# Patient Record
Sex: FEMALE | Race: WHITE | Employment: OTHER | URBAN - METROPOLITAN AREA
[De-identification: names, ages, dates, MRNs, and addresses within clinical notes are randomized per-mention and may not be internally consistent; named-entity substitution may affect disease eponyms.]

---

## 2017-04-26 ENCOUNTER — TELEPHONE (OUTPATIENT)
Dept: UROLOGY | Age: 82
End: 2017-04-26

## 2019-05-17 ENCOUNTER — APPOINTMENT (OUTPATIENT)
Dept: GENERAL RADIOLOGY | Age: 84
DRG: 177 | End: 2019-05-17
Payer: MEDICARE

## 2019-05-17 ENCOUNTER — APPOINTMENT (OUTPATIENT)
Dept: CT IMAGING | Age: 84
DRG: 177 | End: 2019-05-17
Payer: MEDICARE

## 2019-05-17 ENCOUNTER — HOSPITAL ENCOUNTER (INPATIENT)
Age: 84
LOS: 8 days | Discharge: NURSING, REHAB OR LONG-TERM CARE | DRG: 177 | End: 2019-05-25
Attending: EMERGENCY MEDICINE | Admitting: FAMILY MEDICINE
Payer: MEDICARE

## 2019-05-17 DIAGNOSIS — R40.4 TRANSIENT ALTERATION OF AWARENESS: ICD-10-CM

## 2019-05-17 DIAGNOSIS — R09.02 HYPOXIA: ICD-10-CM

## 2019-05-17 DIAGNOSIS — I48.0 PAROXYSMAL ATRIAL FIBRILLATION (HCC): Primary | ICD-10-CM

## 2019-05-17 PROBLEM — J44.9 COPD (CHRONIC OBSTRUCTIVE PULMONARY DISEASE) (HCC): Status: ACTIVE | Noted: 2019-05-17

## 2019-05-17 LAB
-: ABNORMAL
ABSOLUTE EOS #: 0 K/UL (ref 0–0.4)
ABSOLUTE IMMATURE GRANULOCYTE: ABNORMAL K/UL (ref 0–0.3)
ABSOLUTE LYMPH #: 0.9 K/UL (ref 1–4.8)
ABSOLUTE MONO #: 0.9 K/UL (ref 0.1–1.3)
ALBUMIN SERPL-MCNC: 3.2 G/DL (ref 3.5–5.2)
ALBUMIN/GLOBULIN RATIO: ABNORMAL (ref 1–2.5)
ALLEN TEST: ABNORMAL
ALP BLD-CCNC: 111 U/L (ref 35–104)
ALT SERPL-CCNC: 10 U/L (ref 5–33)
AMORPHOUS: ABNORMAL
ANION GAP SERPL CALCULATED.3IONS-SCNC: ABNORMAL MMOL/L (ref 9–17)
AST SERPL-CCNC: 18 U/L
BACTERIA: ABNORMAL
BASOPHILS # BLD: 1 % (ref 0–2)
BASOPHILS ABSOLUTE: 0.1 K/UL (ref 0–0.2)
BILIRUB SERPL-MCNC: 0.41 MG/DL (ref 0.3–1.2)
BILIRUBIN URINE: NEGATIVE
BNP INTERPRETATION: ABNORMAL
BUN BLDV-MCNC: 6 MG/DL (ref 8–23)
BUN BLDV-MCNC: 7 MG/DL (ref 8–23)
BUN/CREAT BLD: ABNORMAL (ref 9–20)
BUN/CREAT BLD: ABNORMAL (ref 9–20)
CALCIUM SERPL-MCNC: 8.5 MG/DL (ref 8.6–10.4)
CALCIUM SERPL-MCNC: 9 MG/DL (ref 8.6–10.4)
CARBOXYHEMOGLOBIN: 1.2 % (ref 0–5)
CASTS UA: ABNORMAL /LPF
CHLORIDE BLD-SCNC: 80 MMOL/L (ref 98–107)
CHLORIDE BLD-SCNC: 81 MMOL/L (ref 98–107)
CHLORIDE BLD-SCNC: 82 MMOL/L (ref 98–107)
CO2: >45 MMOL/L (ref 20–31)
COLOR: YELLOW
COMMENT UA: ABNORMAL
CREAT SERPL-MCNC: 0.5 MG/DL (ref 0.5–0.9)
CREAT SERPL-MCNC: 0.6 MG/DL (ref 0.5–0.9)
CRYSTALS, UA: ABNORMAL /HPF
CULTURE: NORMAL
DIFFERENTIAL TYPE: ABNORMAL
DIRECT EXAM: NORMAL
EOSINOPHILS RELATIVE PERCENT: 1 % (ref 0–4)
EPITHELIAL CELLS UA: ABNORMAL /HPF
FIO2: ABNORMAL
GFR AFRICAN AMERICAN: >60 ML/MIN
GFR AFRICAN AMERICAN: >60 ML/MIN
GFR NON-AFRICAN AMERICAN: >60 ML/MIN
GFR NON-AFRICAN AMERICAN: >60 ML/MIN
GFR SERPL CREATININE-BSD FRML MDRD: ABNORMAL ML/MIN/{1.73_M2}
GLUCOSE BLD-MCNC: 136 MG/DL (ref 70–99)
GLUCOSE BLD-MCNC: 181 MG/DL (ref 65–105)
GLUCOSE BLD-MCNC: 189 MG/DL (ref 70–99)
GLUCOSE URINE: NEGATIVE
HCO3 ARTERIAL: 60.7 MMOL/L (ref 22–26)
HCT VFR BLD CALC: 27.1 % (ref 36–46)
HCT VFR BLD CALC: 29.5 % (ref 36–46)
HEMOGLOBIN: 8.7 G/DL (ref 12–16)
HEMOGLOBIN: 9.6 G/DL (ref 12–16)
IMMATURE GRANULOCYTES: ABNORMAL %
INR BLD: 0.9
KETONES, URINE: NEGATIVE
LEUKOCYTE ESTERASE, URINE: ABNORMAL
LIPASE: 14 U/L (ref 13–60)
LV EF: 55 %
LVEF MODALITY: NORMAL
LYMPHOCYTES # BLD: 10 % (ref 24–44)
Lab: NORMAL
MAGNESIUM: 2.4 MG/DL (ref 1.6–2.6)
MAGNESIUM: 2.5 MG/DL (ref 1.6–2.6)
MCH RBC QN AUTO: 28.1 PG (ref 26–34)
MCH RBC QN AUTO: 28.5 PG (ref 26–34)
MCHC RBC AUTO-ENTMCNC: 32 G/DL (ref 31–37)
MCHC RBC AUTO-ENTMCNC: 32.7 G/DL (ref 31–37)
MCV RBC AUTO: 87.1 FL (ref 80–100)
MCV RBC AUTO: 87.8 FL (ref 80–100)
METHEMOGLOBIN: 0.4 % (ref 0–1.9)
MODE: ABNORMAL
MONOCYTES # BLD: 10 % (ref 1–7)
MUCUS: ABNORMAL
NEGATIVE BASE EXCESS, ART: ABNORMAL MMOL/L (ref 0–2)
NITRITE, URINE: POSITIVE
NOTIFICATION TIME: ABNORMAL
NOTIFICATION: ABNORMAL
NRBC AUTOMATED: ABNORMAL PER 100 WBC
NRBC AUTOMATED: ABNORMAL PER 100 WBC
O2 DEVICE/FLOW/%: ABNORMAL
O2 SAT, ARTERIAL: 87.9 % (ref 95–98)
OTHER OBSERVATIONS UA: ABNORMAL
OXYHEMOGLOBIN: ABNORMAL % (ref 95–98)
PARTIAL THROMBOPLASTIN TIME: 31.3 SEC (ref 24–36)
PATIENT TEMP: 37
PCO2 ARTERIAL: 70.7 MMHG (ref 35–45)
PCO2, ART, TEMP ADJ: ABNORMAL (ref 35–45)
PDW BLD-RTO: 16.3 % (ref 11.5–14.9)
PDW BLD-RTO: 16.4 % (ref 11.5–14.9)
PEEP/CPAP: ABNORMAL
PH ARTERIAL: 7.54 (ref 7.35–7.45)
PH UA: 8.5 (ref 5–8)
PH, ART, TEMP ADJ: ABNORMAL (ref 7.35–7.45)
PLATELET # BLD: 323 K/UL (ref 150–450)
PLATELET # BLD: 333 K/UL (ref 150–450)
PLATELET ESTIMATE: ABNORMAL
PMV BLD AUTO: 7.5 FL (ref 6–12)
PMV BLD AUTO: 7.7 FL (ref 6–12)
PO2 ARTERIAL: 53.3 MMHG (ref 80–100)
PO2, ART, TEMP ADJ: ABNORMAL MMHG (ref 80–100)
POSITIVE BASE EXCESS, ART: ABNORMAL MMOL/L (ref 0–2)
POTASSIUM SERPL-SCNC: 2.8 MMOL/L (ref 3.7–5.3)
POTASSIUM SERPL-SCNC: 2.8 MMOL/L (ref 3.7–5.3)
POTASSIUM SERPL-SCNC: 3.7 MMOL/L (ref 3.7–5.3)
PRO-BNP: ABNORMAL PG/ML
PROCALCITONIN: 0.06 NG/ML
PROTEIN UA: NEGATIVE
PROTHROMBIN TIME: 12.6 SEC (ref 11.8–14.6)
PSV: ABNORMAL
PT. POSITION: ABNORMAL
RBC # BLD: 3.09 M/UL (ref 4–5.2)
RBC # BLD: 3.38 M/UL (ref 4–5.2)
RBC # BLD: ABNORMAL 10*6/UL
RBC UA: ABNORMAL /HPF
RENAL EPITHELIAL, UA: ABNORMAL /HPF
RESPIRATORY RATE: 29
SAMPLE SITE: ABNORMAL
SEG NEUTROPHILS: 78 % (ref 36–66)
SEGMENTED NEUTROPHILS ABSOLUTE COUNT: 6.6 K/UL (ref 1.3–9.1)
SET RATE: ABNORMAL
SODIUM BLD-SCNC: 138 MMOL/L (ref 135–144)
SODIUM BLD-SCNC: 139 MMOL/L (ref 135–144)
SODIUM BLD-SCNC: 141 MMOL/L (ref 135–144)
SPECIFIC GRAVITY UA: 1.01 (ref 1–1.03)
SPECIMEN DESCRIPTION: NORMAL
TEXT FOR RESPIRATORY: ABNORMAL
TOTAL HB: ABNORMAL G/DL (ref 12–16)
TOTAL PROTEIN: 6.9 G/DL (ref 6.4–8.3)
TOTAL RATE: ABNORMAL
TRICHOMONAS: ABNORMAL
TROPONIN INTERP: ABNORMAL
TROPONIN T: ABNORMAL NG/ML
TROPONIN, HIGH SENSITIVITY: 33 NG/L (ref 0–14)
TROPONIN, HIGH SENSITIVITY: 33 NG/L (ref 0–14)
TROPONIN, HIGH SENSITIVITY: 34 NG/L (ref 0–14)
TSH SERPL DL<=0.05 MIU/L-ACNC: 1.11 MIU/L (ref 0.3–5)
TURBIDITY: ABNORMAL
URINE HGB: NEGATIVE
UROBILINOGEN, URINE: NORMAL
VT: ABNORMAL
WBC # BLD: 12.1 K/UL (ref 3.5–11)
WBC # BLD: 8.5 K/UL (ref 3.5–11)
WBC # BLD: ABNORMAL 10*3/UL
WBC UA: ABNORMAL /HPF
YEAST: ABNORMAL

## 2019-05-17 PROCEDURE — 80051 ELECTROLYTE PANEL: CPT

## 2019-05-17 PROCEDURE — 82805 BLOOD GASES W/O2 SATURATION: CPT

## 2019-05-17 PROCEDURE — 6370000000 HC RX 637 (ALT 250 FOR IP): Performed by: INTERNAL MEDICINE

## 2019-05-17 PROCEDURE — 93005 ELECTROCARDIOGRAM TRACING: CPT

## 2019-05-17 PROCEDURE — 6370000000 HC RX 637 (ALT 250 FOR IP): Performed by: EMERGENCY MEDICINE

## 2019-05-17 PROCEDURE — 93306 TTE W/DOPPLER COMPLETE: CPT

## 2019-05-17 PROCEDURE — 36600 WITHDRAWAL OF ARTERIAL BLOOD: CPT

## 2019-05-17 PROCEDURE — 71045 X-RAY EXAM CHEST 1 VIEW: CPT

## 2019-05-17 PROCEDURE — 6370000000 HC RX 637 (ALT 250 FOR IP): Performed by: FAMILY MEDICINE

## 2019-05-17 PROCEDURE — 85730 THROMBOPLASTIN TIME PARTIAL: CPT

## 2019-05-17 PROCEDURE — 94640 AIRWAY INHALATION TREATMENT: CPT

## 2019-05-17 PROCEDURE — 86403 PARTICLE AGGLUT ANTBDY SCRN: CPT

## 2019-05-17 PROCEDURE — 85025 COMPLETE CBC W/AUTO DIFF WBC: CPT

## 2019-05-17 PROCEDURE — 87186 SC STD MICRODIL/AGAR DIL: CPT

## 2019-05-17 PROCEDURE — 85610 PROTHROMBIN TIME: CPT

## 2019-05-17 PROCEDURE — 6360000002 HC RX W HCPCS: Performed by: INTERNAL MEDICINE

## 2019-05-17 PROCEDURE — 96374 THER/PROPH/DIAG INJ IV PUSH: CPT

## 2019-05-17 PROCEDURE — 36415 COLL VENOUS BLD VENIPUNCTURE: CPT

## 2019-05-17 PROCEDURE — 81001 URINALYSIS AUTO W/SCOPE: CPT

## 2019-05-17 PROCEDURE — 87077 CULTURE AEROBIC IDENTIFY: CPT

## 2019-05-17 PROCEDURE — 2580000003 HC RX 258: Performed by: FAMILY MEDICINE

## 2019-05-17 PROCEDURE — 94660 CPAP INITIATION&MGMT: CPT

## 2019-05-17 PROCEDURE — 87070 CULTURE OTHR SPECIMN AEROBIC: CPT

## 2019-05-17 PROCEDURE — 87205 SMEAR GRAM STAIN: CPT

## 2019-05-17 PROCEDURE — 87185 SC STD ENZYME DETCJ PER NZM: CPT

## 2019-05-17 PROCEDURE — 82947 ASSAY GLUCOSE BLOOD QUANT: CPT

## 2019-05-17 PROCEDURE — 80048 BASIC METABOLIC PNL TOTAL CA: CPT

## 2019-05-17 PROCEDURE — 70450 CT HEAD/BRAIN W/O DYE: CPT

## 2019-05-17 PROCEDURE — 96375 TX/PRO/DX INJ NEW DRUG ADDON: CPT

## 2019-05-17 PROCEDURE — 2700000000 HC OXYGEN THERAPY PER DAY

## 2019-05-17 PROCEDURE — 84484 ASSAY OF TROPONIN QUANT: CPT

## 2019-05-17 PROCEDURE — 83690 ASSAY OF LIPASE: CPT

## 2019-05-17 PROCEDURE — 84443 ASSAY THYROID STIM HORMONE: CPT

## 2019-05-17 PROCEDURE — 94761 N-INVAS EAR/PLS OXIMETRY MLT: CPT

## 2019-05-17 PROCEDURE — 80053 COMPREHEN METABOLIC PANEL: CPT

## 2019-05-17 PROCEDURE — 99285 EMERGENCY DEPT VISIT HI MDM: CPT

## 2019-05-17 PROCEDURE — 6360000002 HC RX W HCPCS: Performed by: FAMILY MEDICINE

## 2019-05-17 PROCEDURE — 83735 ASSAY OF MAGNESIUM: CPT

## 2019-05-17 PROCEDURE — 83880 ASSAY OF NATRIURETIC PEPTIDE: CPT

## 2019-05-17 PROCEDURE — 2060000000 HC ICU INTERMEDIATE R&B

## 2019-05-17 PROCEDURE — 85027 COMPLETE CBC AUTOMATED: CPT

## 2019-05-17 PROCEDURE — 84145 PROCALCITONIN (PCT): CPT

## 2019-05-17 PROCEDURE — 87086 URINE CULTURE/COLONY COUNT: CPT

## 2019-05-17 PROCEDURE — 6360000002 HC RX W HCPCS: Performed by: EMERGENCY MEDICINE

## 2019-05-17 RX ORDER — POTASSIUM CHLORIDE 20 MEQ/1
20 TABLET, EXTENDED RELEASE ORAL ONCE
Status: COMPLETED | OUTPATIENT
Start: 2019-05-17 | End: 2019-05-17

## 2019-05-17 RX ORDER — SODIUM CHLORIDE 0.9 % (FLUSH) 0.9 %
10 SYRINGE (ML) INJECTION PRN
Status: DISCONTINUED | OUTPATIENT
Start: 2019-05-17 | End: 2019-05-26 | Stop reason: HOSPADM

## 2019-05-17 RX ORDER — IPRATROPIUM BROMIDE AND ALBUTEROL SULFATE 2.5; .5 MG/3ML; MG/3ML
3 SOLUTION RESPIRATORY (INHALATION) ONCE
Status: COMPLETED | OUTPATIENT
Start: 2019-05-17 | End: 2019-05-17

## 2019-05-17 RX ORDER — ACETAMINOPHEN 325 MG/1
650 TABLET ORAL EVERY 4 HOURS PRN
Status: DISCONTINUED | OUTPATIENT
Start: 2019-05-17 | End: 2019-05-26 | Stop reason: HOSPADM

## 2019-05-17 RX ORDER — SPIRONOLACTONE 25 MG/1
50 TABLET ORAL DAILY
Status: DISCONTINUED | OUTPATIENT
Start: 2019-05-17 | End: 2019-05-26 | Stop reason: HOSPADM

## 2019-05-17 RX ORDER — POTASSIUM CHLORIDE 20 MEQ/1
40 TABLET, EXTENDED RELEASE ORAL ONCE
Status: COMPLETED | OUTPATIENT
Start: 2019-05-17 | End: 2019-05-17

## 2019-05-17 RX ORDER — FUROSEMIDE 10 MG/ML
40 INJECTION INTRAMUSCULAR; INTRAVENOUS ONCE
Status: COMPLETED | OUTPATIENT
Start: 2019-05-17 | End: 2019-05-17

## 2019-05-17 RX ORDER — POTASSIUM CHLORIDE 7.45 MG/ML
10 INJECTION INTRAVENOUS PRN
Status: DISCONTINUED | OUTPATIENT
Start: 2019-05-17 | End: 2019-05-26 | Stop reason: HOSPADM

## 2019-05-17 RX ORDER — GUAIFENESIN 600 MG/1
600 TABLET, EXTENDED RELEASE ORAL 2 TIMES DAILY
Status: DISCONTINUED | OUTPATIENT
Start: 2019-05-17 | End: 2019-05-26 | Stop reason: HOSPADM

## 2019-05-17 RX ORDER — METHYLPREDNISOLONE SODIUM SUCCINATE 40 MG/ML
20 INJECTION, POWDER, LYOPHILIZED, FOR SOLUTION INTRAMUSCULAR; INTRAVENOUS EVERY 6 HOURS
Status: DISCONTINUED | OUTPATIENT
Start: 2019-05-17 | End: 2019-05-18

## 2019-05-17 RX ORDER — ACETYLCYSTEINE 200 MG/ML
600 SOLUTION ORAL; RESPIRATORY (INHALATION) 3 TIMES DAILY PRN
Status: DISCONTINUED | OUTPATIENT
Start: 2019-05-17 | End: 2019-05-26 | Stop reason: HOSPADM

## 2019-05-17 RX ORDER — METHYLPREDNISOLONE SODIUM SUCCINATE 40 MG/ML
40 INJECTION, POWDER, LYOPHILIZED, FOR SOLUTION INTRAMUSCULAR; INTRAVENOUS EVERY 6 HOURS
Status: DISCONTINUED | OUTPATIENT
Start: 2019-05-17 | End: 2019-05-17

## 2019-05-17 RX ORDER — SODIUM CHLORIDE 0.9 % (FLUSH) 0.9 %
10 SYRINGE (ML) INJECTION EVERY 12 HOURS SCHEDULED
Status: DISCONTINUED | OUTPATIENT
Start: 2019-05-17 | End: 2019-05-26 | Stop reason: HOSPADM

## 2019-05-17 RX ORDER — POTASSIUM CHLORIDE 20 MEQ/1
40 TABLET, EXTENDED RELEASE ORAL PRN
Status: DISCONTINUED | OUTPATIENT
Start: 2019-05-17 | End: 2019-05-26 | Stop reason: HOSPADM

## 2019-05-17 RX ORDER — POTASSIUM CHLORIDE 20 MEQ/1
20 TABLET, EXTENDED RELEASE ORAL ONCE
Status: COMPLETED | OUTPATIENT
Start: 2019-05-18 | End: 2019-05-18

## 2019-05-17 RX ORDER — METHYLPREDNISOLONE SODIUM SUCCINATE 125 MG/2ML
125 INJECTION, POWDER, LYOPHILIZED, FOR SOLUTION INTRAMUSCULAR; INTRAVENOUS ONCE
Status: COMPLETED | OUTPATIENT
Start: 2019-05-17 | End: 2019-05-17

## 2019-05-17 RX ORDER — IPRATROPIUM BROMIDE AND ALBUTEROL SULFATE 2.5; .5 MG/3ML; MG/3ML
1 SOLUTION RESPIRATORY (INHALATION)
Status: DISCONTINUED | OUTPATIENT
Start: 2019-05-17 | End: 2019-05-20

## 2019-05-17 RX ORDER — FUROSEMIDE 10 MG/ML
40 INJECTION INTRAMUSCULAR; INTRAVENOUS DAILY
Status: DISCONTINUED | OUTPATIENT
Start: 2019-05-18 | End: 2019-05-19

## 2019-05-17 RX ADMIN — FUROSEMIDE 40 MG: 10 INJECTION, SOLUTION INTRAMUSCULAR; INTRAVENOUS at 02:25

## 2019-05-17 RX ADMIN — DILTIAZEM HYDROCHLORIDE 30 MG: 30 TABLET, FILM COATED ORAL at 04:38

## 2019-05-17 RX ADMIN — Medication 10 ML: at 21:07

## 2019-05-17 RX ADMIN — IPRATROPIUM BROMIDE AND ALBUTEROL SULFATE 1 AMPULE: .5; 3 SOLUTION RESPIRATORY (INHALATION) at 16:15

## 2019-05-17 RX ADMIN — DILTIAZEM HYDROCHLORIDE 30 MG: 30 TABLET, FILM COATED ORAL at 17:36

## 2019-05-17 RX ADMIN — METOPROLOL TARTRATE 25 MG: 25 TABLET ORAL at 21:05

## 2019-05-17 RX ADMIN — Medication 10 ML: at 08:49

## 2019-05-17 RX ADMIN — GUAIFENESIN 600 MG: 600 TABLET, EXTENDED RELEASE ORAL at 14:41

## 2019-05-17 RX ADMIN — ENOXAPARIN SODIUM 40 MG: 100 INJECTION SUBCUTANEOUS at 08:49

## 2019-05-17 RX ADMIN — DILTIAZEM HYDROCHLORIDE 30 MG: 30 TABLET, FILM COATED ORAL at 14:08

## 2019-05-17 RX ADMIN — SPIRONOLACTONE 50 MG: 25 TABLET, FILM COATED ORAL at 08:49

## 2019-05-17 RX ADMIN — IPRATROPIUM BROMIDE AND ALBUTEROL SULFATE 1 AMPULE: .5; 3 SOLUTION RESPIRATORY (INHALATION) at 19:06

## 2019-05-17 RX ADMIN — METHYLPREDNISOLONE SODIUM SUCCINATE 40 MG: 40 INJECTION, POWDER, FOR SOLUTION INTRAMUSCULAR; INTRAVENOUS at 08:49

## 2019-05-17 RX ADMIN — IPRATROPIUM BROMIDE AND ALBUTEROL SULFATE 3 AMPULE: .5; 3 SOLUTION RESPIRATORY (INHALATION) at 00:39

## 2019-05-17 RX ADMIN — POTASSIUM CHLORIDE 20 MEQ: 1500 TABLET, EXTENDED RELEASE ORAL at 11:30

## 2019-05-17 RX ADMIN — METHYLPREDNISOLONE SODIUM SUCCINATE 20 MG: 40 INJECTION, POWDER, FOR SOLUTION INTRAMUSCULAR; INTRAVENOUS at 21:05

## 2019-05-17 RX ADMIN — GUAIFENESIN 600 MG: 600 TABLET, EXTENDED RELEASE ORAL at 21:05

## 2019-05-17 RX ADMIN — ACETYLCYSTEINE 600 MG: 200 SOLUTION ORAL; RESPIRATORY (INHALATION) at 19:06

## 2019-05-17 RX ADMIN — METHYLPREDNISOLONE SODIUM SUCCINATE 125 MG: 125 INJECTION, POWDER, FOR SOLUTION INTRAMUSCULAR; INTRAVENOUS at 01:23

## 2019-05-17 RX ADMIN — CEFTRIAXONE SODIUM 1 G: 1 INJECTION, POWDER, FOR SOLUTION INTRAMUSCULAR; INTRAVENOUS at 08:55

## 2019-05-17 RX ADMIN — METOPROLOL TARTRATE 25 MG: 25 TABLET ORAL at 04:38

## 2019-05-17 RX ADMIN — POTASSIUM CHLORIDE 20 MEQ: 1500 TABLET, EXTENDED RELEASE ORAL at 06:22

## 2019-05-17 RX ADMIN — IPRATROPIUM BROMIDE AND ALBUTEROL SULFATE 1 AMPULE: .5; 3 SOLUTION RESPIRATORY (INHALATION) at 08:10

## 2019-05-17 RX ADMIN — POTASSIUM CHLORIDE 40 MEQ: 1500 TABLET, EXTENDED RELEASE ORAL at 02:21

## 2019-05-17 RX ADMIN — ACETAMINOPHEN 650 MG: 325 TABLET, FILM COATED ORAL at 17:54

## 2019-05-17 ASSESSMENT — PAIN SCALES - GENERAL
PAINLEVEL_OUTOF10: 0
PAINLEVEL_OUTOF10: 0
PAINLEVEL_OUTOF10: 3
PAINLEVEL_OUTOF10: 0
PAINLEVEL_OUTOF10: 0

## 2019-05-17 NOTE — PROGRESS NOTES
Dr. Kashmir Rosas wants patient intermediate.   No new orders Electronically signed by Clyde Velásquez RN on 5/17/2019 at 6:51 PM

## 2019-05-17 NOTE — ED NOTES
Report given to Batsheva Moyer from [employer]. Report method in person   The following was reviewed with receiving RN:   Current vital signs:  BP (!) 121/53   Pulse 109   Temp 98.2 °F (36.8 °C) (Axillary)   Resp 29   Ht 5' 2\" (1.575 m)   Wt 100 lb (45.4 kg)   SpO2 94%   BMI 18.29 kg/m²                MEWS Score: 4     Any medication or safety alerts were reviewed. Any pending diagnostics and notifications were also reviewed, as well as any safety concerns or issues, abnormal labs, abnormal imaging, and abnormal assessment findings. Questions were answered.             Osman Acosta RN  05/17/19 0720

## 2019-05-17 NOTE — DISCHARGE INSTR - COC
severe ([facility] [address] E43    Decubitus ulcer, stage 1 L89.91    E-coli UTI N39.0, B96.20    Acute cystitis N30.00    Acute on chronic diastolic congestive heart failure ([facility]) I50.33    Hypophosphatemia E83.39    Non-sustained ventricular tachycardia ([facility]) I47.2    Leukocytosis D72.829    Anemia D64.9    Hypokalemia E87.6    Enterococcus UTI N39.0, B95.2    Urinary tract infection without hematuria N39.0    Hyperkalemia E87.5    COPD (chronic obstructive pulmonary disease) ([facility]) J44.9       Isolation/Infection:   Isolation          Contact        Patient Infection Status     Infection Encounter Level? Onset Date Added Added By Resolved Resolved By Review Date    ESBL (Extended Spectrum Beta Lactamase) No  09/07/16 Kimberley Cooper RN       9/3/2016 ecoli urine          Nurse Assessment:  Last Vital Signs: BP (!) 105/50   Pulse 79   Temp 99.5 °F (37.5 °C) (Oral)   Resp 28   Ht 5' 2\" (1.575 m)   Wt 90 lb 13.3 oz (41.2 kg)   SpO2 96%   BMI 16.61 kg/m²     Last documented pain score (0-10 scale): Pain Level: 0  Last Weight:   Wt Readings from Last 1 Encounters:   05/17/19 90 lb 13.3 oz (41.2 kg)     Mental Status:  oriented and alert    IV Access:  - None    Nursing Mobility/ADLs:  Walking   Dependent  Transfer  Dependent  Bathing  Dependent  Dressing  Dependent  Toileting  Dependent  Feeding  Assisted  Med Admin  Assisted  Med Delivery   whole and prefers mixed with applesauce    Wound Care Documentation and Therapy:  Wound 05/17/19 Buttocks Left (Active)   Wound Pressure Stage  2 5/17/2019  8:00 AM   Dressing/Treatment Protective barrier 5/17/2019  8:00 AM   Wound Assessment Clean;Pale;Pink;Fragile 5/17/2019  8:00 AM   Drainage Amount None 5/17/2019  3:45 AM   Keeley-wound Assessment Pink 5/17/2019  8:00 AM   Number of days: 0        Elimination:  Continence:   · Bowel:  Yes  · Bladder: Yes  Urinary Catheter: None   Colostomy/Ileostomy/Ileal Conduit: No       Date of Last BM: 5/22/19    Intake/Output Summary (Last 24 hours) at 5/17/2019 1158  Last data filed at 5/17/2019 1107  Gross per 24 hour   Intake 240 ml   Output --   Net 240 ml     No intake/output data recorded. Safety Concerns: At Risk for Falls and Aspiration Risk    Impairments/Disabilities:      None    Nutrition Therapy:  Current Nutrition Therapy:   - Oral Diet:  General    Routes of Feeding: Oral  Liquids: Thin Liquids  Daily Fluid Restriction: no  Last Modified Barium Swallow with Video (Video Swallowing Test): not done    Treatments at the Time of Hospital Discharge:   Respiratory Treatments: See MAR  Oxygen Therapy:  Oxygen via nasal cannula 4 liters  Ventilator:    - No ventilator support    Rehab Therapies: Physical Therapy and Occupational Therapy  Weight Bearing Status/Restrictions: No weight bearing restirctions  Other Medical Equipment (for information only, NOT a DME order): Other Treatments: Skilled nursing assessment per protocol and system specific. Medication education and monitoring. Home health care agency's  to evaluate patient two weeks prior to discharge from home health to determine post-discharge needs. Patient's personal belongings (please select all that are sent with patient):  None    RN SIGNATURE:  Electronically signed by Naomi Ball RN on 5/23/19 at 4:49 PM    CASE MANAGEMENT/SOCIAL WORK SECTION    Inpatient Status Date: 5/17/2019    Readmission Risk Assessment Score:  Readmission Risk              Risk of Unplanned Readmission:        18           Discharging to Facility/ Agency   [facility]  [address] [address] [facility], [address] [facility]  Phone [phone]  Fax [phone]      / signature: Electronically signed by ALESSIA Lazaro on 5/23/19 at 9:47 AM    PHYSICIAN SECTION    Prognosis: Fair    Condition at Discharge: Stable    Rehab Potential (if transferring to Rehab): Good    Recommended Labs or Other Treatments After Discharge:     Physician Certification: I certify the above information and transfer of Hortensia Vivas  is necessary for the continuing treatment of the diagnosis listed and that she requires [place] for less 30 days.      Update Admission H&P: No change in H&P    PHYSICIAN SIGNATURE:  Electronically signed by Fide Nunn MD on 5/23/19 at 8:27 AM

## 2019-05-17 NOTE — ED PROVIDER NOTES
eMERGENCY dEPARTMENT eNCOUnter    Pt Name: Ward Ballard  MRN: [id number]  Bhavnagfkarime 10/15/1933  Date of evaluation: 5/17/19  CHIEF COMPLAINT       Chief Complaint   Patient presents with    Shortness of Breath     HISTORY OF PRESENT ILLNESS   HPI  HISTORY OF PRESENT ILLNESS:  Past medical history of CAD presents for chief complaint of cough and altered mental status. Per EMS family called because patient is been ill. They state that she's been coughing and unable to get anything up. They also state that for the past 2-3 weeks she's also been hallucinating and seeing things that aren't there. Patient has no complaint at this time. No chest pain. No shortness of breath. Her only complaint is that \"I'm filling up with fluid. \"  Patient denies any lightheadedness or dizziness. No urinary complaints. No fevers or chills. Severity is moderate. No aggravating or relieving factors. Timing is couple days. Course is intermittent.   Context is multiple medical problems  -----------------------  -----------------------  REVIEW OF SYSTEMS  ED Caveat: [none]  Gen:  No fever, no chills  CV: No CP, no palpitations  Resp: No SOB, no respiratory distress  GI: No V/D, no abd pain  : No dysuria, no increased frequency  Skin: No rash, no purulent lesions  Eyes: No blurry vision, No double vision  MSK: No back pain, no joint pain  Neuro: No HA, no sensation changes  Psych: No SI/HI  -----------------------  -----------------------  ALLERGIES  -per nursing records, reviewed    PAST MEDICAL HISTORY  -See HPI    SOCIAL HISTORY  -No daily drinking, no IV drugs  -----------------------  -----------------------  PHYSICAL EXAM  Gen: Alert, no acute distress  Skin: Warm, no rashes  Head: Normocephalic, atraumatic  Neck: No midline tenderness, no nuchal rigidity  Eye: EOMI, PERRLA, normal conjunctiva  ENT: Mucous membranes moist, no pharyngeal erythema  CV: Normal rate, no rubs  Resp: Respirations unlabored, left-sided crackles  GI: Soft, non distended, no large abdominal masses, non tender  MSK: No midline back pain, no large joint effusions  Neuro: Alert, no focal neurological deficits observed  Psych: Cooperative, appropriate mood and affect  -----------------------  -----------------------  MEDICAL DECISION MAKING  Differential Diagnosis:  - Consideration is given foruti, pneumonia, meningitis, cellulitis, sepsis, bacteremia, thyroid abnormality, neuroleptic malignant syndrome, intracranial hemorhage, intraabdominal infection, cva, acs, cardiac arrhythmia, encephalitis, encephalopathy, medication overdose, drug overdose, seizure, elevated ammonia,       -  #Impression/Plan:  - Clinically patient's presentation is most consistent with  pulmonary edema or pneumonia. Given patient's presentation will get laboratory testing and imaging. We'll also speak with family regarding patient's presentation and complaints. Disposition will be based off of laboratory testing and discussion with family. Clinically she is very well-appearing at this time and have local suspicion of acute life threatening abnormality.  -  ##Reevaluation/Conversations on care:  - ekg non ischemic, pt remained hypoxic here, will admit  - accepted by [facility] at 0230  ##Diagnosis:  -Cough, altered mental status  -  -----------------------  -----------------------  Celso Richter MD, [facility]  Emergency Medicine Attending  Questions? Please contact my cell phone anytime.   [phone]  *This charting supersedes any ED resident or staff charting and was written using speech recognition software  PASTMEDICAL HISTORY     Past Medical History:   Diagnosis Date    Arthritis     Atrial fibrillation ([facility] [address]     COPD (chronic obstructive pulmonary disease) ([facility] [address]     Diverticulitis     Hypertension     MI, old     2010    Thyroid disease      SURGICAL HISTORY       Past Surgical History:   Procedure Laterality Date    ABDOMEN SURGERY      BUNIONECTOMY Left     CARDIAC SURGERY Stents x 4    HERNIA REPAIR       CURRENT MEDICATIONS       Previous Medications    ALPRAZOLAM (XANAX) 0.25 MG TABLET    Take 0.25 mg by mouth 3 times daily as needed for Sleep    ASPIRIN 325 MG TABLET    Take 325 mg by mouth daily    ATORVASTATIN (LIPITOR) 20 MG TABLET    Take 20 mg by mouth daily     BETHANECHOL (URECHOLINE) 10 MG TABLET    Take 10 mg by mouth 3 times daily    BISACODYL (DULCOLAX) 10 MG SUPPOSITORY    Place 10 mg rectally daily as needed for Constipation    CALCIUM CARBONATE (OSCAL) 500 MG TABS TABLET    Take 500 mg by mouth daily    CLOPIDOGREL (PLAVIX) 75 MG TABLET    Take 75 mg by mouth daily    DILTIAZEM (CARDIZEM) 30 MG TABLET    Take 30 mg by mouth three times daily HOLD FOR HR LESS THAN 60 OR BP LESS THAN 95    FUROSEMIDE (LASIX) 40 MG TABLET    Take 1 tablet by mouth daily    GUAIFENESIN (MUCINEX) 600 MG EXTENDED RELEASE TABLET    Take 2 tablets by mouth 2 times daily    IPRATROPIUM (ATROVENT) 0.02 % NEBULIZER SOLUTION    Take 2.5 mLs by nebulization every 4 hours    LEVALBUTEROL (XOPENEX) 1.25 MG/0.5ML NEBULIZER SOLUTION    Take 0.5 mLs by nebulization every 4 hours (while awake)    MAGNESIUM HYDROXIDE (MILK OF MAGNESIA) 400 MG/5ML SUSPENSION    Take 30 mLs by mouth daily as needed for Constipation    MAGNESIUM OXIDE (MAG-OX) 400 MG TABLET    Take 400 mg by mouth 2 times daily    MAGNESIUM-OXIDE 400 (241.3 MG) MG TABS TABLET    Take 1 tablet by mouth 2 times daily     METOPROLOL TARTRATE (LOPRESSOR) 25 MG TABLET    Take 25 mg by mouth 2 times daily    MIDODRINE (PROAMATINE) 2.5 MG TABLET    Take 2.5 mg by mouth 3 times daily as needed (SBP <90)    PANTOPRAZOLE (PROTONIX) 40 MG TABLET    Take 1 tablet by mouth every morning (before breakfast)    SENNA-DOCUSATE (PERICOLACE) 8.6-50 MG PER TABLET    Take 2 tablets by mouth 2 times daily     SODIUM CHLORIDE 1 G TABLET    Take 1 g by mouth daily    TRAMADOL (ULTRAM) 50 MG TABLET    Take 1 tablet by mouth every 6 hours as needed for Pain ALLERGIES     has No Known Allergies. FAMILY HISTORY     indicated that the status of her father is unknown. She indicated that the status of her paternal grandmother is unknown.      SOCIAL HISTORY       Social History     Tobacco Use    Smoking status: Former Smoker    Smokeless tobacco: Never Used    Tobacco comment: has not smoke since hospital admission 9/27/16   Substance Use Topics    Alcohol use: No    Drug use: No     PHYSICAL EXAM     INITIAL VITALS: BP (!) 129/59   Pulse 134   Temp 98.1 °F (36.7 °C) (Oral)   Resp 29   Ht 5' 2\" (1.575 m)   Wt 100 lb (45.4 kg)   SpO2 91%   BMI 18.29 kg/m²    Physical Exam    MEDICAL DECISION MAKING:            Labs Reviewed   CBC WITH AUTO DIFFERENTIAL - Abnormal; Notable for the following components:       Result Value    RBC 3.38 (*)     Hemoglobin 9.6 (*)     Hematocrit 29.5 (*)     RDW 16.4 (*)     Seg Neutrophils 78 (*)     Lymphocytes 10 (*)     Monocytes 10 (*)     Absolute Lymph # 0.90 (*)     All other components within normal limits   COMPREHENSIVE METABOLIC PANEL W/ REFLEX TO MG FOR LOW K - Abnormal; Notable for the following components:    Glucose 136 (*)     BUN 6 (*)     Potassium 2.8 (*)     Chloride 80 (*)     CO2 >45 (*)     Alkaline Phosphatase 111 (*)     Alb 3.2 (*)     All other components within normal limits   TROPONIN - Abnormal; Notable for the following components:    Troponin, High Sensitivity 34 (*)     All other components within normal limits   BRAIN NATRIURETIC PEPTIDE - Abnormal; Notable for the following components:    Pro-BNP 29,432 (*)     All other components within normal limits   BLOOD GAS, ARTERIAL - Abnormal; Notable for the following components:    pH, Arterial 7.542 (*)     pCO2, Arterial 70.7 (*)     pO2, Arterial 53.3 (*)     HCO3, Arterial 60.7 (*)     O2 Sat, Arterial 87.9 (*)     All other components within normal limits   LIPASE   PROTIME-INR   APTT   MAGNESIUM   TROPONIN   URINE RT REFLEX TO CULTURE   BLOOD GAS, VENOUS     EMERGENCY DEPARTMENTCOURSE:         Vitals:    Vitals:    05/17/19 0124 05/17/19 0200 05/17/19 0215 05/17/19 0231   BP:  (!) 128/53 (!) 129/59    Pulse:  131 101 134   Resp: (!) 36 22 (!) 31 29   Temp:       TempSrc:       SpO2: 91% (!) 88% (!) 86% 91%   Weight:       Height:           The patient was given the following medications while in the emergency department:  Orders Placed This Encounter   Medications    ipratropium-albuterol (DUONEB) nebulizer solution 3 ampule    methylPREDNISolone sodium (SOLU-MEDROL) injection 125 mg    potassium chloride (KLOR-CON M) extended release tablet 40 mEq    furosemide (LASIX) injection 40 mg     CONSULTS:  None    FINAL IMPRESSION      1.  Hypoxia          DISPOSITION/PLAN   DISPOSITION Admitted 05/17/2019 02:33:44 AM      PATIENT REFERRED TO:  Alvaro Kingston MD  [address] [phone]          DISCHARGE MEDICATIONS:  New Prescriptions    No medications on file     Rafael Mohr MD  Attending Emergency Physician                    Tyrell Em MD  05/17/19 2254

## 2019-05-17 NOTE — FLOWSHEET NOTE
05/17/19 0426   Provider Notification   Reason for Communication Evaluate   Provider Name Dr. Britany Sorto   Provider Notification Physician   Method of Communication Call   Response See orders   Notification Time 0400   Notified Dr. Britany Sorto of patient's admission to unit. Updated on current elevated HR & RR, labs, CXR, and assessment. Orders received.

## 2019-05-17 NOTE — CARE COORDINATION
CASE MANAGEMENT NOTE:    Admission Date:  5/17/2019 Donis Alvares is a 80 y.o.  female    Admitted for : COPD (chronic obstructive pulmonary disease) ([facility] [address] [J44.9]  COPD (chronic obstructive pulmonary disease) ([facility] [address] [J44.9]    Met with: Chart Notes    PCP:  Dr. Milind Boss:  N/A      Current Residence/ Living Arrangements:  in nursing home - From [facility] and will return there             Is the Patient an Frazr with Readmission Risk Score greater than 14%? No  If yes, pt needs a follow up appointment made within 7 days. Family Members/Caregivers that pt would like involved in their care:    Yes    If yes, list name here:  Son Miranda Barros     Is patient in Isolation/One on One/Altered Mental Status? Yes  If yes, skip next question. If no, would they like an I-Pad to  use? NA  If yes, call [phone]. Discharge Plan:  5/17: UNKNOWN INSURANCE - Patient is from [facility] and will return there. Per previous notes, a precert has been required for patient to return there - LSW to follow for this. On IV rocephin, IV lasix daily, IV steroids 40Q6. MAXINE NEEDS SIGNED/COMPLETED. Mar Mascorro                  Electronically signed by: Abraham Monsalve RN on 5/17/2019 at 9:41 AM

## 2019-05-17 NOTE — PROGRESS NOTES
Pt admitted to icu 8 cardiac monitor, nibp and sao2 applied. Pt oriented to surroundings. Vs obtained. Routine of the unit and call light explained.  Belongings in closet

## 2019-05-17 NOTE — PROGRESS NOTES
Report called to PCU RN and before patient transport pt o2 dropped to 86%. RN and RT attempted to NT suction patient with minimal success. HR 's. ICU manager notified of patient o2 dropping and issues with HR increasing. RN paged Dr. Lalo Jung at this time to update him. Awaiting call back.  Electronically signed by Scarlet Dan RN on 5/17/2019 at 6:43 PM

## 2019-05-17 NOTE — PROGRESS NOTES
Dr. Bhakti Hunter notified of patient run of afib RVR for approx 6 seconds. Order to obtain elytes now, oral potassium replacement scale, and to increase Cardizem dose.  Electronically signed by Sangeeta Hsieh RN on 5/17/2019 at 5:31 PM

## 2019-05-17 NOTE — ED NOTES
Pt presents to the ED via EMS with complaints of feeling \"ill\". Pt states she is SOB and \"full of fluid\". Pt is tachypneic and is on O2 at 5L at this time. Pt O2 is 81-82%. Respiratory called. EKG done.  Pt unable to give history of situation and unable to given medication list.      Hakan Bueno RN  05/17/19 6200

## 2019-05-17 NOTE — H&P
[address]                 [address] [facility], [address]                              HISTORY AND PHYSICAL    PATIENT NAME: Red Salinas                      :        10/15/1933  MED REC NO:   [id number]                              ROOM:       2008  ACCOUNT NO:   [de-identified]                           ADMIT DATE: 2019  PROVIDER:     Crystal Lemons    HISTORY OF PRESENT ILLNESS:  This 80-year-old female, presents with  recent onset of increasing shortness of breath. She has been coughing a  lot more and bringing up copious amount of mucus. PAST MEDICAL HISTORY:  Includes COPD, essential hypertension, atrial  fibrillation, and coronary artery disease. SOCIAL HISTORY:  The patient lives with son and family. She is a  previous smoker, but has not smoked recently. MEDICATIONS:  Lasix, potassium, diltiazem, and metoprolol. PHYSICAL EXAMINATION:  VITAL SIGNS:  Blood pressure 122/50, pulse 84, respirations 22, O2 sat  97% with nasal canula O2.  CONSTITUTIONAL:  She states her breathing is better. She is in no acute  distress currently. HEENT:  Normocephalic. NECK:  Without bruit. HEART:  Irregularly irregular rhythm. LUNGS:  Breath sounds diminished throughout with scattered bilateral  weaves. ABDOMEN:  Soft, nontender. EXTREMITIES:  Non-edematous. NEUROPSYCHIATRIC:  The patient is alert and conversant with appropriate  mood and thought content. DIAGNOSTIC IMPRESSION:  1. Acute exacerbation chronic obstructive pulmonary disease. 2.  Atrial fibrillation. 3.  Hypokalemia. 4.  Coronary artery disease.         Kashif Dutta    D: 2019 8:59:22       T: 2019 9:54:33     [email or web address]  Job#: [id number]     Doc#: [id number]    CC:

## 2019-05-17 NOTE — ED NOTES
Dr. Elia Howard notified of sat of 84% on 5L.  Respiratory notified and pt placed on venti mask     Tonya Peterson RN  05/17/19 4465

## 2019-05-17 NOTE — PROGRESS NOTES
abg results given to Dr. Melanie Dimas. ABG sample was analyzed x2 on 2 separate analyzers to ensure accuracy.

## 2019-05-17 NOTE — PLAN OF CARE
Problem: Falls - Risk of:  Goal: Will remain free from falls  Description  Will remain free from falls  5/17/2019 1552 by Maximo Carpenter RN  Outcome: Ongoing  Note:   Pt remains free of falls this shift. Approprate safety measures in place   5/17/2019 0558 by Evgeny Pacheco RN  Outcome: Ongoing  Note:   Fall precautions remain in place  Goal: Absence of physical injury  Description  Absence of physical injury  Outcome: Ongoing     Problem: Discharge Planning:  Goal: Discharged to appropriate level of care  Description  Discharged to appropriate level of care  5/17/2019 1552 by Maximo Carpenter RN  Outcome: Ongoing  5/17/2019 0558 by Evgeny Pacheco RN  Outcome: Ongoing     Problem: Activity Intolerance:  Goal: Ability to tolerate increased activity will improve  Description  Ability to tolerate increased activity will improve  5/17/2019 1552 by Maximo Carpenter RN  Outcome: Ongoing  Note:   Pt up to chair with one assist. Pt SOB with exertion  5/17/2019 0558 by Evgeny Pacheco RN  Outcome: Ongoing     Problem: Airway Clearance - Ineffective:  Goal: Ability to maintain a clear airway will improve  Description  Ability to maintain a clear airway will improve  5/17/2019 1552 by Maximo Carpenter RN  Outcome: Ongoing  Note:   Pt on 4 L NC this shfit. Pt desats with oxygen removal  5/17/2019 0558 by Evgeny Pacheco RN  Outcome: Ongoing     Problem: Gas Exchange - Impaired:  Goal: Levels of oxygenation will improve  Description  Levels of oxygenation will improve  5/17/2019 1552 by Maximo Carpenter RN  Outcome: Ongoing  5/17/2019 0558 by Evgeny Pacheco RN  Outcome: Ongoing  Note:   Patient requires venti mask for adequate oxygen levels.

## 2019-05-17 NOTE — CONSULTS
Consult note dictated:    Acute on chronic respiratory failure with hypoxia/on continuous home O2  Metabolic alkalosis  Hypercapnia  Acute exacerbation of COPD  ? ? Acute bronchitis  Tobacco abuse 1-pack per day for over 60 years quit few months ago  Hypervolemia/bilateral pleural effusions, EF over 55% on the  September 2016 echocardiogram, ?? If has any diastolic dysfunction , proBNP 29,432  Hypertension, CAD paroxysmal A.  Fib  Chronic anemia

## 2019-05-17 NOTE — LETTER
Beneficiary Notification Letter     This [place] Provider is Participating in an Innovative Payment and [address] [place] [address] [place] from Medicare     Greetings:   Martina Caceres is participating in a Medicare initiative called the [facility] for [address] [facility]. You are receiving this letter because your health care provider has identified you as a patient who is receiving care through this initiative. Health care providers participating in the [facility] [address] [facility], including Martina Caceres, will work with Medicare to improve care for patients. Your Medicare rights have not been changed. You still have all the same Medicare rights and protections, including the right to choose which hospital, doctor, or other health care provider you see. However, because Martina Caceres chose to participate in the [address] [place] [address], all Medicare beneficiaries who meet the eligibility criteria of this initiative will receive care under the initiative. If you do not wish to receive care under the [facility] [address] [facility], you must choose a health care provider that does not participate in this initiative for your care. Regardless of which health care provider you see, Medicare will continue to cover all of your medically necessary services. Bundled Payments for Care Improvement Advanced aims to help improve your care     The [facility] [address] [facility] is an innovative Medicare initiative that encourages your doctors, hospitals, and other health care providers to work more closely together so you get better care during and following certain hospital stays.  In this initiative, doctors and hospitals may work closely with certain health care providers and suppliers that help patients recover after discharge from the hospital, including skilled nursing facilities, home health agencies, inpatient rehabilitation facilities, and long term care hospitals. Wenmari Morris is working closely with the doctors and other health care providers that care for you during and following your hospital stay and for a period of time after you leave the hospital. By working together, the health care providers are trying to more efficiently provide well-managed, high quality, patient-centered care as you undergo treatment. Hospitals, doctors, and other health care providers that care for you following a hospital stay may receive an additional payment for providing better, more coordinated health care. Medicare will monitor your care to make sure you and others get high quality care. Your feedback is important     Medicare may also ask you to answer a survey about the services and care you received from [facility] [place] will be mailed to you. Your feedback will improve care for all people with Medicare who receive care from [facility]antoni Caceres. Completion of this survey is optional.     Get more information     For more information about the Bundled Payments for [address] [place] [address], you can:    · Visit the CMS BPCI Advanced Website at http://bonilla-eduardo.net/ initiatives/bpci-advanced   · Call the [facility] team at (332) 520-4483. · Call 1-800-MEDICARE (3-781.339.9283). TTY users can call 9-864.118.6672     If you have concerns or complaints about your care, talk to your health care provider, or contact your Beneficiary and Family Centered Quality Improvement Organization [facility] GARC[facility]). To get your CC-QIO's phone number, visit Medicare.gov/contacts or call 1-800-MEDICARE. · To find a different hospital, visit www. hospitalcompare.[facility].gov or call 1-800- MEDICARE (6-116.929.7799). TTY users should call 0-225.175.5374. · To find a different doctor, visit Medicare's Physician Compare website, [employer]co.nz, or call 1-800-MEDICARE (454 2629). TTY users should call 3-602.164.2952. · To find a different skilled nursing facility, visit [employer] website, https://www.[employer]/, or call 1-800-MEDICARE (1- 223.355.9351). TTY users should call 2-435.178.4174. · To find a different long term care hospital, visit [facility] [address] Compare website, [employer], or call 1-800- MEDICARE (626 9200). TTY users should call 5-285.900.4675. · To find a different inpatient rehabilitation facility, visit [facility] [address] Compare website, www.medicare.gov/ inpatientrehabilitation facilitycompare, or call 1-800-MEDICARE (9-383.414.8655). TTY users should call 3- 945.484.9500. · To find a different home health agency, visit [address] website, www.medicare.gov/homehealthcompare, or call 1-800-MEDICARE (3-523- 568-7582). TTY users should call 4-622.423.4494.

## 2019-05-18 LAB
ALLEN TEST: ABNORMAL
ANION GAP SERPL CALCULATED.3IONS-SCNC: ABNORMAL MMOL/L (ref 9–17)
BUN BLDV-MCNC: 14 MG/DL (ref 8–23)
BUN/CREAT BLD: ABNORMAL (ref 9–20)
CALCIUM SERPL-MCNC: 9.2 MG/DL (ref 8.6–10.4)
CARBOXYHEMOGLOBIN: 1.3 % (ref 0–5)
CHLORIDE BLD-SCNC: 83 MMOL/L (ref 98–107)
CO2: >45 MMOL/L (ref 20–31)
CREAT SERPL-MCNC: 0.68 MG/DL (ref 0.5–0.9)
FIO2: ABNORMAL
GFR AFRICAN AMERICAN: >60 ML/MIN
GFR NON-AFRICAN AMERICAN: >60 ML/MIN
GFR SERPL CREATININE-BSD FRML MDRD: ABNORMAL ML/MIN/{1.73_M2}
GFR SERPL CREATININE-BSD FRML MDRD: ABNORMAL ML/MIN/{1.73_M2}
GLUCOSE BLD-MCNC: 181 MG/DL (ref 70–99)
HCO3 ARTERIAL: 52.2 MMOL/L (ref 22–26)
METHEMOGLOBIN: 0 % (ref 0–1.9)
MODE: ABNORMAL
NEGATIVE BASE EXCESS, ART: ABNORMAL MMOL/L (ref 0–2)
NOTIFICATION TIME: ABNORMAL
NOTIFICATION: ABNORMAL
O2 DEVICE/FLOW/%: ABNORMAL
O2 SAT, ARTERIAL: 87 % (ref 95–98)
OXYHEMOGLOBIN: ABNORMAL % (ref 95–98)
PATIENT TEMP: 37
PCO2 ARTERIAL: 57.3 MMHG (ref 35–45)
PCO2, ART, TEMP ADJ: ABNORMAL (ref 35–45)
PEEP/CPAP: ABNORMAL
PH ARTERIAL: 7.57 (ref 7.35–7.45)
PH, ART, TEMP ADJ: ABNORMAL (ref 7.35–7.45)
PO2 ARTERIAL: 51.2 MMHG (ref 80–100)
PO2, ART, TEMP ADJ: ABNORMAL MMHG (ref 80–100)
POSITIVE BASE EXCESS, ART: 30.2 MMOL/L (ref 0–2)
POTASSIUM SERPL-SCNC: 3.8 MMOL/L (ref 3.7–5.3)
PSV: ABNORMAL
PT. POSITION: ABNORMAL
RESPIRATORY RATE: 24
SAMPLE SITE: ABNORMAL
SET RATE: ABNORMAL
SODIUM BLD-SCNC: 138 MMOL/L (ref 135–144)
TEXT FOR RESPIRATORY: ABNORMAL
TOTAL HB: ABNORMAL G/DL (ref 12–16)
TOTAL RATE: ABNORMAL
VT: ABNORMAL

## 2019-05-18 PROCEDURE — 36415 COLL VENOUS BLD VENIPUNCTURE: CPT

## 2019-05-18 PROCEDURE — 6370000000 HC RX 637 (ALT 250 FOR IP): Performed by: FAMILY MEDICINE

## 2019-05-18 PROCEDURE — 2580000003 HC RX 258: Performed by: FAMILY MEDICINE

## 2019-05-18 PROCEDURE — 82805 BLOOD GASES W/O2 SATURATION: CPT

## 2019-05-18 PROCEDURE — 36600 WITHDRAWAL OF ARTERIAL BLOOD: CPT

## 2019-05-18 PROCEDURE — 6360000002 HC RX W HCPCS: Performed by: FAMILY MEDICINE

## 2019-05-18 PROCEDURE — 94761 N-INVAS EAR/PLS OXIMETRY MLT: CPT

## 2019-05-18 PROCEDURE — 94640 AIRWAY INHALATION TREATMENT: CPT

## 2019-05-18 PROCEDURE — 2700000000 HC OXYGEN THERAPY PER DAY

## 2019-05-18 PROCEDURE — 6360000002 HC RX W HCPCS: Performed by: INTERNAL MEDICINE

## 2019-05-18 PROCEDURE — 2060000000 HC ICU INTERMEDIATE R&B

## 2019-05-18 PROCEDURE — 6370000000 HC RX 637 (ALT 250 FOR IP): Performed by: INTERNAL MEDICINE

## 2019-05-18 PROCEDURE — 94660 CPAP INITIATION&MGMT: CPT

## 2019-05-18 PROCEDURE — 80048 BASIC METABOLIC PNL TOTAL CA: CPT

## 2019-05-18 PROCEDURE — 2580000003 HC RX 258: Performed by: INTERNAL MEDICINE

## 2019-05-18 PROCEDURE — 2500000003 HC RX 250 WO HCPCS: Performed by: INTERNAL MEDICINE

## 2019-05-18 RX ORDER — ALPRAZOLAM 0.25 MG/1
0.25 TABLET ORAL 3 TIMES DAILY PRN
Status: DISCONTINUED | OUTPATIENT
Start: 2019-05-18 | End: 2019-05-19

## 2019-05-18 RX ADMIN — IPRATROPIUM BROMIDE AND ALBUTEROL SULFATE 1 AMPULE: .5; 3 SOLUTION RESPIRATORY (INHALATION) at 14:44

## 2019-05-18 RX ADMIN — METHYLPREDNISOLONE SODIUM SUCCINATE 20 MG: 40 INJECTION, POWDER, FOR SOLUTION INTRAMUSCULAR; INTRAVENOUS at 08:12

## 2019-05-18 RX ADMIN — IPRATROPIUM BROMIDE AND ALBUTEROL SULFATE 1 AMPULE: .5; 3 SOLUTION RESPIRATORY (INHALATION) at 07:00

## 2019-05-18 RX ADMIN — GUAIFENESIN 600 MG: 600 TABLET, EXTENDED RELEASE ORAL at 21:27

## 2019-05-18 RX ADMIN — CEFTRIAXONE SODIUM 1 G: 1 INJECTION, POWDER, FOR SOLUTION INTRAMUSCULAR; INTRAVENOUS at 08:13

## 2019-05-18 RX ADMIN — IPRATROPIUM BROMIDE AND ALBUTEROL SULFATE 1 AMPULE: .5; 3 SOLUTION RESPIRATORY (INHALATION) at 21:03

## 2019-05-18 RX ADMIN — DEXMEDETOMIDINE HYDROCHLORIDE 0.5 MCG/KG/HR: 100 INJECTION, SOLUTION INTRAVENOUS at 17:05

## 2019-05-18 RX ADMIN — DILTIAZEM HYDROCHLORIDE 30 MG: 30 TABLET, FILM COATED ORAL at 06:18

## 2019-05-18 RX ADMIN — Medication 10 ML: at 08:15

## 2019-05-18 RX ADMIN — DEXMEDETOMIDINE HYDROCHLORIDE 0.2 MCG/KG/HR: 100 INJECTION, SOLUTION INTRAVENOUS at 08:27

## 2019-05-18 RX ADMIN — POTASSIUM CHLORIDE 20 MEQ: 1500 TABLET, EXTENDED RELEASE ORAL at 06:19

## 2019-05-18 RX ADMIN — IPRATROPIUM BROMIDE AND ALBUTEROL SULFATE 1 AMPULE: .5; 3 SOLUTION RESPIRATORY (INHALATION) at 10:38

## 2019-05-18 RX ADMIN — GUAIFENESIN 600 MG: 600 TABLET, EXTENDED RELEASE ORAL at 08:13

## 2019-05-18 RX ADMIN — METHYLPREDNISOLONE SODIUM SUCCINATE 20 MG: 40 INJECTION, POWDER, FOR SOLUTION INTRAMUSCULAR; INTRAVENOUS at 01:27

## 2019-05-18 RX ADMIN — FUROSEMIDE 40 MG: 10 INJECTION, SOLUTION INTRAMUSCULAR; INTRAVENOUS at 08:12

## 2019-05-18 RX ADMIN — METOPROLOL TARTRATE 25 MG: 25 TABLET ORAL at 21:27

## 2019-05-18 RX ADMIN — DILTIAZEM HYDROCHLORIDE 30 MG: 30 TABLET, FILM COATED ORAL at 01:27

## 2019-05-18 RX ADMIN — SPIRONOLACTONE 50 MG: 25 TABLET, FILM COATED ORAL at 08:12

## 2019-05-18 RX ADMIN — METOPROLOL TARTRATE 25 MG: 25 TABLET ORAL at 08:12

## 2019-05-18 NOTE — CONSULTS
COPD.  She had no prior history of cancer and  had thyroid disease, hypertension, coronary artery disease, paroxysmal  AFib. PAST SURGICAL HISTORY:  Had hernia repair, stent placement before x4,  had left bunionectomy and abdominal surgery. FAMILY HISTORY:  Positive for cancer and heart disease. SOCIAL HISTORY:  She smoked over a pack a day over 60 years. She quit  few months ago. No alcohol abuse. ALLERGIES:  No known allergies. HOME MEDICATIONS:  She is on oxygen at home continuously but she could  not remember how much. She is on Xopenex and Xanax. She was on Mucinex  and Atrovent. Rest of her medication noted. Current medication noted. PHYSICAL EXAMINATION:  VITAL SIGNS:  Temperature is 99.5, pulse is 87, respiratory rate 26,  blood pressure is 141/45, saturation 93% on 4 liters. HEENT:  No icterus noted. NECK:  No JVD, lymphadenopathy noted around her neck. HEART:  S1, S2 with extra beats. Monitor showing some PACs. LUNGS:  Few crepitation at the bases. Mild distress. No wheezing. ABDOMEN:  Soft, no guarding. Bowel sounds present. EXTREMITIES:  No edema, calf tenderness or stiffness. SKIN:  No new rash. As noted had a chronic decubitus ulcer, quarter  sized in the coccyx area. NEURO:  She is awake, alert, appropriate, following commands now. IMAGING DATA:  Her chest x-ray showed congestion and bilateral pleural  effusion. Her sputum is still pending. LABORATORY DATA:  Lab work, her potassium was 2.8 and BUN is 7,  creatinine 0.6, blood sugar is 189. Her proBNP was 29,432. LFTs were  remarkable for albumin of 3.2. Her white count was 12.1, hemoglobin  8.7, platelets is 482 and blood gas showed pH 7.54, pCO2 70, pO2 was 53  and bicarb at 60. ASSESSMENT:  1. Acute-on-chronic respiratory failure with hypoxia. 2.  Metabolic alkalosis. 3.  Hypercapnia. 4.  Acute exacerbation of COPD.  5.  Possible acute bronchitis.   6.  Tobacco abuse, over a pack a day for over 60

## 2019-05-18 NOTE — PROGRESS NOTES
ICU Progress Note (Non-Vent)  Pulmonary and Critical Care Specialists    Patient - Donis Alvares,  Age - 80 y.o.    - 10/15/1933      Room Number -    MRN -  [id number]   [facility]  Date of Admission -  2019 12:32 AM     Follow-up: Acute respiratory failure    Events of Past 24 Hours   Restless and agitated last night, currently on Precedex drip at 0.8 was up to 6 L O2 now down to 5  Fluctuating mental status, no fever chills  Not Much short of breath, nonproductive cough  No Nausea vomiting noted by staff    Vitals    height is 5' 2\" (1.575 m) and weight is 96 lb 5.5 oz (43.7 kg). Her axillary temperature is 98.1 °F (36.7 °C). Her blood pressure is 105/41 (abnormal) and her pulse is 69. Her respiration is 18 and oxygen saturation is 93%.        Temperature Range: Temp: 98.1 °F (36.7 °C) Temp  Av.4 °F (36.9 °C)  Min: 98 °F (36.7 °C)  Max: 98.8 °F (37.1 °C)  BP Range:  Systolic (23ABP), AVN:449 , Min:101 , ADAM:049     Diastolic (57KRF), GRJ:35, Min:40, Max:97    Pulse Range: Pulse  Av.4  Min: 63  Max: 108  Respiration Range: Resp  Av.7  Min: 17  Max: 36  Current Pulse Ox[de-identified]  SpO2: 93 %  24HR Pulse Ox Range:  SpO2  Av %  Min: 84 %  Max: 97 %  Oxygen Amount and Delivery: O2 Flow Rate (L/min): 5 L/min    Wt Readings from Last 3 Encounters:   19 96 lb 5.5 oz (43.7 kg)   16 103 lb 9.9 oz (47 kg)   10/02/16 105 lb 13.1 oz (48 kg)     I/O       Intake/Output Summary (Last 24 hours) at 2019 1224  Last data filed at 2019 0152  Gross per 24 hour   Intake 120 ml   Output 300 ml   Net -180 ml     DRAIN/TUBE OUTPUT       Invasive Lines   ICP PRESSURE RANGE  No data recorded  CVP PRESSURE RANGE  No data recorded      Medications      [START ON 2019] enoxaparin  30 mg Subcutaneous Daily    sodium chloride flush  10 mL Intravenous 2 times per day    spironolactone  50 mg Oral Daily    metoprolol tartrate  25 mg Oral BID    ipratropium-albuterol  1 ampule Inhalation Q4H WA    cefTRIAXone (ROCEPHIN) IV  1 g Intravenous Q24H    furosemide  40 mg Intravenous Daily    methylPREDNISolone  20 mg Intravenous Q6H    guaiFENesin  600 mg Oral BID    diltiazem  30 mg Oral 4 times per day     sodium chloride flush, acetaminophen, acetylcysteine, perflutren lipid microspheres, potassium chloride **OR** potassium alternative oral replacement **OR** potassium chloride  IV Drips/Infusions   dexmedetomidine (PRECEDEX) IV infusion 0.06 mcg/kg/hr (05/18/19 1033)       Diet/Nutrition   DIET GENERAL;    Exam      Constitutional - somnolent on Precedex drip  General Appearance  well developed, no distress  HEENT -normocephalic, atraumatic. PERRLA  Lungs - Chest expands equally, no wheezes, rales or rhonchi. Few bibasilar crepitations  Cardiovascular - Heart sounds are normal.  normal rate and rhythm irregular, no murmur, gallop or rub.   Abdomen - soft, nontender, nondistended, no guarding  Neurologic - no restlessness or agitation noted in the  Skin - no bruising or bleeding  Extremities - no cyanosis, clubbing or edema    Lab Results   CBC     Lab Results   Component Value Date    WBC 12.1 05/17/2019    RBC 3.09 05/17/2019    RBC 4.91 09/22/2011    HGB 8.7 05/17/2019    HCT 27.1 05/17/2019     05/17/2019     09/22/2011    MCV 87.8 05/17/2019    MCH 28.1 05/17/2019    MCHC 32.0 05/17/2019    RDW 16.3 05/17/2019    LYMPHOPCT 10 05/17/2019    MONOPCT 10 05/17/2019    BASOPCT 1 05/17/2019    MONOSABS 0.90 05/17/2019    LYMPHSABS 0.90 05/17/2019    EOSABS 0.00 05/17/2019    BASOSABS 0.10 05/17/2019    DIFFTYPE NOT REPORTED 05/17/2019       BMP   Lab Results   Component Value Date     05/18/2019    K 3.8 05/18/2019    CL 83 05/18/2019    CO2 >45 05/18/2019    BUN 14 05/18/2019    CREATININE 0.68 05/18/2019    GLUCOSE 181 05/18/2019    GLUCOSE 114 09/22/2011       LFTS  Lab Results   Component

## 2019-05-18 NOTE — PLAN OF CARE
Problem: Falls - Risk of:  Goal: Will remain free from falls  Description  Will remain free from falls  5/18/2019 0516 by Deepa Evans RN  Outcome: Ongoing  Note:   Pt remains free from falls this shift. Bed is locked, in lowest position, and call light within reach. Bed alarm is on. Problem: Gas Exchange - Impaired:  Goal: Levels of oxygenation will improve  Description  Levels of oxygenation will improve  5/18/2019 0516 by Deepa Evans RN  Outcome: Ongoing  Note:   No complaints of shortness of breath this shift. Pt oxygen remains >90% on 4-5 L NC. Pt wore bipap for several hours last night.

## 2019-05-18 NOTE — PROGRESS NOTES
Rn spoke with Vida Powell in regards to critical lab results for CO2 >45. No new orders received at this time.

## 2019-05-18 NOTE — PROGRESS NOTES
Late entry; Pt taked off BIPAP by Estefani Bhagat at 1145 pm 5-17-19. Pt placed on 5L N/C and Sa02=95%. Pt shows no distress and is sleeping.

## 2019-05-18 NOTE — PROGRESS NOTES
Pt O2 sat 84-87% on NC @ 4L. Pt experiencing hallucinations, asks this RN to removed the cat from her bed. No response to redirection. RN places patient on bipap, patient immediately begins to scream, O2 sat drops to 80%, nc applied. RN places page out for physician.

## 2019-05-19 PROBLEM — E43 SEVERE MALNUTRITION (HCC): Chronic | Status: ACTIVE | Noted: 2019-05-19

## 2019-05-19 LAB
-: ABNORMAL
AMORPHOUS: ABNORMAL
ANION GAP SERPL CALCULATED.3IONS-SCNC: 9 MMOL/L (ref 9–17)
BACTERIA: ABNORMAL
BILIRUBIN URINE: NEGATIVE
BUN BLDV-MCNC: 20 MG/DL (ref 8–23)
BUN/CREAT BLD: ABNORMAL (ref 9–20)
CALCIUM SERPL-MCNC: 8.9 MG/DL (ref 8.6–10.4)
CASTS UA: ABNORMAL /LPF
CHLORIDE BLD-SCNC: 86 MMOL/L (ref 98–107)
CO2: 44 MMOL/L (ref 20–31)
COLOR: YELLOW
COMMENT UA: ABNORMAL
CREAT SERPL-MCNC: 0.75 MG/DL (ref 0.5–0.9)
CRYSTALS, UA: ABNORMAL /HPF
CULTURE: ABNORMAL
EPITHELIAL CELLS UA: ABNORMAL /HPF
GFR AFRICAN AMERICAN: >60 ML/MIN
GFR NON-AFRICAN AMERICAN: >60 ML/MIN
GFR SERPL CREATININE-BSD FRML MDRD: ABNORMAL ML/MIN/{1.73_M2}
GFR SERPL CREATININE-BSD FRML MDRD: ABNORMAL ML/MIN/{1.73_M2}
GLUCOSE BLD-MCNC: 133 MG/DL (ref 70–99)
GLUCOSE URINE: NEGATIVE
KETONES, URINE: NEGATIVE
LEUKOCYTE ESTERASE, URINE: ABNORMAL
Lab: ABNORMAL
MUCUS: ABNORMAL
NITRITE, URINE: NEGATIVE
OTHER OBSERVATIONS UA: ABNORMAL
PH UA: 8 (ref 5–8)
POTASSIUM SERPL-SCNC: 4.7 MMOL/L (ref 3.7–5.3)
PROTEIN UA: NEGATIVE
RBC UA: ABNORMAL /HPF
RENAL EPITHELIAL, UA: ABNORMAL /HPF
SODIUM BLD-SCNC: 139 MMOL/L (ref 135–144)
SPECIFIC GRAVITY UA: 1.01 (ref 1–1.03)
SPECIMEN DESCRIPTION: ABNORMAL
TRICHOMONAS: ABNORMAL
TURBIDITY: ABNORMAL
URINE HGB: NEGATIVE
UROBILINOGEN, URINE: NORMAL
WBC UA: ABNORMAL /HPF
YEAST: ABNORMAL

## 2019-05-19 PROCEDURE — 87077 CULTURE AEROBIC IDENTIFY: CPT

## 2019-05-19 PROCEDURE — 6360000002 HC RX W HCPCS: Performed by: INTERNAL MEDICINE

## 2019-05-19 PROCEDURE — 6370000000 HC RX 637 (ALT 250 FOR IP): Performed by: FAMILY MEDICINE

## 2019-05-19 PROCEDURE — 87186 SC STD MICRODIL/AGAR DIL: CPT

## 2019-05-19 PROCEDURE — 2700000000 HC OXYGEN THERAPY PER DAY

## 2019-05-19 PROCEDURE — 36415 COLL VENOUS BLD VENIPUNCTURE: CPT

## 2019-05-19 PROCEDURE — 2580000003 HC RX 258: Performed by: FAMILY MEDICINE

## 2019-05-19 PROCEDURE — 2500000003 HC RX 250 WO HCPCS: Performed by: INTERNAL MEDICINE

## 2019-05-19 PROCEDURE — 80048 BASIC METABOLIC PNL TOTAL CA: CPT

## 2019-05-19 PROCEDURE — 6370000000 HC RX 637 (ALT 250 FOR IP): Performed by: INTERNAL MEDICINE

## 2019-05-19 PROCEDURE — 6360000002 HC RX W HCPCS: Performed by: FAMILY MEDICINE

## 2019-05-19 PROCEDURE — 81001 URINALYSIS AUTO W/SCOPE: CPT

## 2019-05-19 PROCEDURE — 87086 URINE CULTURE/COLONY COUNT: CPT

## 2019-05-19 PROCEDURE — 94761 N-INVAS EAR/PLS OXIMETRY MLT: CPT

## 2019-05-19 PROCEDURE — 94640 AIRWAY INHALATION TREATMENT: CPT

## 2019-05-19 PROCEDURE — 94660 CPAP INITIATION&MGMT: CPT

## 2019-05-19 PROCEDURE — 2580000003 HC RX 258: Performed by: INTERNAL MEDICINE

## 2019-05-19 PROCEDURE — 2060000000 HC ICU INTERMEDIATE R&B

## 2019-05-19 RX ORDER — QUETIAPINE FUMARATE 50 MG/1
25 TABLET, FILM COATED ORAL NIGHTLY
Status: DISCONTINUED | OUTPATIENT
Start: 2019-05-19 | End: 2019-05-26 | Stop reason: HOSPADM

## 2019-05-19 RX ORDER — FUROSEMIDE 10 MG/ML
20 INJECTION INTRAMUSCULAR; INTRAVENOUS DAILY
Status: DISCONTINUED | OUTPATIENT
Start: 2019-05-20 | End: 2019-05-26 | Stop reason: HOSPADM

## 2019-05-19 RX ORDER — ALPRAZOLAM 0.5 MG/1
0.5 TABLET ORAL 3 TIMES DAILY PRN
Status: DISCONTINUED | OUTPATIENT
Start: 2019-05-19 | End: 2019-05-26 | Stop reason: HOSPADM

## 2019-05-19 RX ADMIN — IPRATROPIUM BROMIDE AND ALBUTEROL SULFATE 1 AMPULE: .5; 3 SOLUTION RESPIRATORY (INHALATION) at 07:59

## 2019-05-19 RX ADMIN — ALPRAZOLAM 0.5 MG: 0.5 TABLET ORAL at 14:53

## 2019-05-19 RX ADMIN — CEFTRIAXONE SODIUM 1 G: 1 INJECTION, POWDER, FOR SOLUTION INTRAMUSCULAR; INTRAVENOUS at 08:34

## 2019-05-19 RX ADMIN — DILTIAZEM HYDROCHLORIDE 30 MG: 30 TABLET, FILM COATED ORAL at 04:27

## 2019-05-19 RX ADMIN — FUROSEMIDE 40 MG: 10 INJECTION, SOLUTION INTRAMUSCULAR; INTRAVENOUS at 08:34

## 2019-05-19 RX ADMIN — SPIRONOLACTONE 50 MG: 25 TABLET, FILM COATED ORAL at 08:34

## 2019-05-19 RX ADMIN — Medication 10 ML: at 20:16

## 2019-05-19 RX ADMIN — IPRATROPIUM BROMIDE AND ALBUTEROL SULFATE 1 AMPULE: .5; 3 SOLUTION RESPIRATORY (INHALATION) at 16:10

## 2019-05-19 RX ADMIN — ACETYLCYSTEINE 600 MG: 200 SOLUTION ORAL; RESPIRATORY (INHALATION) at 11:53

## 2019-05-19 RX ADMIN — ENOXAPARIN SODIUM 30 MG: 30 INJECTION SUBCUTANEOUS at 08:36

## 2019-05-19 RX ADMIN — QUETIAPINE FUMARATE 25 MG: 50 TABLET ORAL at 20:13

## 2019-05-19 RX ADMIN — IPRATROPIUM BROMIDE AND ALBUTEROL SULFATE 1 AMPULE: .5; 3 SOLUTION RESPIRATORY (INHALATION) at 20:55

## 2019-05-19 RX ADMIN — METOPROLOL TARTRATE 25 MG: 25 TABLET ORAL at 08:33

## 2019-05-19 RX ADMIN — GUAIFENESIN 600 MG: 600 TABLET, EXTENDED RELEASE ORAL at 08:33

## 2019-05-19 RX ADMIN — DEXMEDETOMIDINE HYDROCHLORIDE 0.4 MCG/KG/HR: 100 INJECTION, SOLUTION INTRAVENOUS at 06:03

## 2019-05-19 RX ADMIN — METOPROLOL TARTRATE 25 MG: 25 TABLET ORAL at 20:13

## 2019-05-19 RX ADMIN — IPRATROPIUM BROMIDE AND ALBUTEROL SULFATE 1 AMPULE: .5; 3 SOLUTION RESPIRATORY (INHALATION) at 11:51

## 2019-05-19 RX ADMIN — GUAIFENESIN 600 MG: 600 TABLET, EXTENDED RELEASE ORAL at 20:13

## 2019-05-19 RX ADMIN — ALPRAZOLAM 0.25 MG: 0.25 TABLET ORAL at 04:39

## 2019-05-19 ASSESSMENT — PAIN SCALES - GENERAL: PAINLEVEL_OUTOF10: 0

## 2019-05-19 NOTE — PROGRESS NOTES
Leukocytosis     Hypophosphatemia 09/09/2016    Non-sustained ventricular tachycardia ([address] [facility] 09/09/2016    Acute on chronic diastolic congestive heart failure ([address] [facility] 09/08/2016    E-coli UTI 09/07/2016    Acute cystitis     Septic shock due to undetermined organism ([facility] 09/06/2016    Protein-calorie malnutrition, severe ([facility] 09/06/2016    Decubitus ulcer, stage 1 09/06/2016    Sepsis due to pneumonia ([facility] 09/04/2016    Hyponatremia 09/04/2016    Tachycardia 09/04/2016    Altered mental status 09/04/2016    Hypertension 09/04/2016    Chronic obstructive pulmonary disease with acute lower respiratory infection ([facility] 09/04/2016    A-fib ([facility] 09/04/2016    CAD (coronary artery disease) 09/04/2016        Plan:   1. Cont same.     Electronically signed by Angela Ball MD on 5/18/2019 at 10:03 PM

## 2019-05-19 NOTE — PLAN OF CARE
Problem: Falls - Risk of:  Goal: Will remain free from falls  Description  Will remain free from falls  5/19/2019 0302 by Brijesh Penn RN  Outcome: Ongoing  Note:   Patient confused at times. Weak. Making no attempt to get out of bed per self. Bed alarm remains on during shift. 5/18/2019 1751 by Jake Jaimes RN  Outcome: Met This Shift  Goal: Absence of physical injury  Description  Absence of physical injury  5/19/2019 0302 by Brijesh Penn RN  Outcome: Ongoing  5/18/2019 1751 by Jake Jaimes RN  Outcome: Ongoing     Problem: Discharge Planning:  Goal: Discharged to appropriate level of care  Description  Discharged to appropriate level of care  5/19/2019 0302 by Brijesh Penn RN  Outcome: Ongoing  5/18/2019 1751 by Jake Jaimes RN  Outcome: Ongoing     Problem: Activity Intolerance:  Goal: Ability to tolerate increased activity will improve  Description  Ability to tolerate increased activity will improve  5/19/2019 0302 by Brijesh Penn RN  Outcome: Ongoing  5/18/2019 1751 by Jake Jaimes RN  Outcome: Ongoing     Problem: Airway Clearance - Ineffective:  Goal: Ability to maintain a clear airway will improve  Description  Ability to maintain a clear airway will improve  5/19/2019 0302 by Brijesh Penn RN  Outcome: Ongoing  5/18/2019 1751 by Jake Jaimes RN  Outcome: Ongoing     Problem: Gas Exchange - Impaired:  Goal: Levels of oxygenation will improve  Description  Levels of oxygenation will improve  5/19/2019 0302 by Brijesh Penn RN  Outcome: Ongoing  Note:   Patient very short of breath with exertion. Congested non productive cough. Allowed bipap to be placed at HS and remain on. Oxygen sat on bipap in high 90s. Precedix drip continues with stable BP and pulse.   Arouses easily with precidex drip on.  5/18/2019 1751 by Jake Jaimes RN  Outcome: Ongoing     Problem: Anxiety:  Goal: Level of anxiety will decrease  Description  Level of anxiety will decrease  5/19/2019 0302 by Brijesh Penn, RN  Outcome: Ongoing  Note:   Precidex continues.   Allowing bipap with precidex on.  5/18/2019 1751 by Jake Jaimes, RN  Outcome: Ongoing

## 2019-05-19 NOTE — CONSULTS
vascular disease, femoral artery bruit, stents  Anemia  Severe COPD secondary to smoking  Status post bowel surgery history of septic shock in 2016  History of recurrent UTI    Vitals: BP (!) 102/35   Pulse 60-75   Temp 98.2 °F (36.8 °C) (Oral)   Resp 19   Ht 5' 2\" (1.575 m)   Wt 95 lb 7.4 oz (43.3 kg)   SpO2 99%   BMI 17.46 kg/m²     Patient seen and examined in the ICU and discussed with the patient's nurse  Patient had episode of A. fib with the slow heart rate up to 40 bpm  Cardizem on a hold    Elderly frail female looks pale  At present does not appears in any significant distress  ECG monitor A. fib, heart rate 70    Medications:   Scheduled Meds:   QUEtiapine  25 mg Oral Nightly    enoxaparin  30 mg Subcutaneous Daily    sodium chloride flush  10 mL Intravenous 2 times per day    spironolactone  50 mg Oral Daily    metoprolol tartrate  25 mg Oral BID    ipratropium-albuterol  1 ampule Inhalation Q4H WA    cefTRIAXone (ROCEPHIN) IV  1 g Intravenous Q24H    furosemide  40 mg Intravenous Daily    guaiFENesin  600 mg Oral BID    diltiazem  30 mg Oral 4 times per day     Continuous Infusions:   dexmedetomidine (PRECEDEX) IV infusion Stopped (05/19/19 1015)     CBC:   Recent Labs     05/17/19  0045 05/17/19  0511   WBC 8.5 12.1*   HGB 9.6* 8.7*    323     BMP:    Recent Labs     05/17/19  0511 05/17/19  1802 05/18/19  0503 05/19/19  0421    139 138 139   K 2.8* 3.7 3.8 4.7   CL 81* 82* 83* 86*   CO2 >45* >45* >45* 44*   BUN 7*  --  14 20   CREATININE 0.60  --  0.68 0.75   GLUCOSE 189*  --  181* 133*     Hepatic:   Recent Labs     05/17/19  0045   AST 18   ALT 10   BILITOT 0.41   ALKPHOS 111*     Troponin: No results for input(s): TROPONINI in the last 72 hours. BNP: No results for input(s): BNP in the last 72 hours. Lipids: No results for input(s): CHOL, HDL in the last 72 hours.     Invalid input(s): LDLCALCU  INR:   Recent Labs     05/17/19  0045   INR 0.9       Objective: Vitals: BP (!) 88/35   Pulse 64   Temp 98.2 °F (36.8 °C) (Oral)   Resp 19   Ht 5' 2\" (1.575 m)   Wt 95 lb 7.4 oz (43.3 kg)   SpO2 99%   BMI 17.46 kg/m²   General appearance: alert and cooperative with exam  HEENT: Head: Normal, normocephalic, atraumatic. Neck: no JVD, supple, symmetrical, trachea midline and thyroid not enlarged, symmetric, no tenderness/mass/nodules  Lungs: diminished breath sounds bilaterally and rales bibasilar  Heart: Cardiac apical impulse does not appears displaced, heart sounds distant  Abdomen: Abdomen is soft, bowel sounds present  Extremities: Homans sign is negative, no sign of DVT  Neurologic: Mental status: At present communicating well    EKG: atrial fibrillation, rate 70, unchanged from previous tracings. ECHO: reviewed. Ejection fraction: 55%  Stress Test: not obtained. Cardiac Angiography: not obtained.         Assessment / Acute Cardiac Problems:   Patient admitted on 5/17/2019 with respiratory failure, severe hypercapnia, severe hypoxia, severe alkalosis, hallucination and altered mental status  ABG on admission pH 7.54, PCO2 71, PCO2 54, oxygen saturation 88%, bicarb 60.7 and base excess 64.0  Diastolic congestive heart failure with bilateral pleural effusion, proBNP 29,430-1 5/17/2019  A. fib with RVR on admission at present heart rate stable, episode of A. fib with slow ventricular response heart rate 40-50 on 5/19/2019    Congestive heart failure diastolic, ejection fraction 55%  Coronary artery disease, stents ×5  Hypertension  Peripheral vascular disease, femoral artery bruit, stents  Anemia  Severe COPD secondary to smoking  Status post bowel surgery history of septic shock in 2016  History of recurrent UTI      Patient Active Problem List:     Sepsis due to pneumonia ([address]     Hyponatremia     Tachycardia     Altered mental status     Hypertension     Chronic obstructive pulmonary disease with acute lower respiratory infection (HCC)     A-fib ([address]     CAD (coronary artery disease)     Septic shock due to undetermined organism ([facility]     Protein-calorie malnutrition, severe (HCC)     Decubitus ulcer, stage 1     E-coli UTI     Acute cystitis     Acute on chronic diastolic congestive heart failure (HCC)     Hypophosphatemia     Non-sustained ventricular tachycardia ([facility])     Leukocytosis     Anemia     Hypokalemia     Enterococcus UTI     Urinary tract infection without hematuria     Hyperkalemia     COPD (chronic obstructive pulmonary disease) ([facility]      Plan of Treatment:   Reduce Lasix to 20 mg IV tomorrow  Change Cardizem 2 when necessary 30 mg every 6 hours for heart rate of oh 100  Continue with other medication  Continuing ECG monitoring        Electronically signed by Kimberly Miller MD on 5/19/2019 at 12:52 PM

## 2019-05-19 NOTE — PLAN OF CARE
Nutrition Problem: Inadequate oral intake  Intervention: Food and/or Nutrient Delivery: Continue current diet, Start ONS  Nutritional Goals: Adequate nutrition intake or provision

## 2019-05-19 NOTE — PROGRESS NOTES
ICU Progress Note (Non-Vent)  Pulmonary and Critical Care Specialists    Patient - Katlyn Farrell,  Age - 80 y.o.    - 10/15/1933      Room Number -    MRN -  [id number]   [facility]  Date of Admission -  2019 12:32 AM     Follow-up: Acute respiratory failure    Events of Past 24 Hours   Still on Precedex drip at 0.6   On 5 L O2 now   Fluctuating mental status, no fever chills  Not Much short of breath, cough  No Nausea vomiting   No fever or chills noted by staff    Vitals    height is 5' 2\" (1.575 m) and weight is 95 lb 7.4 oz (43.3 kg). Her oral temperature is 98.2 °F (36.8 °C). Her blood pressure is 118/48 (abnormal) and her pulse is 86. Her respiration is 34 (abnormal) and oxygen saturation is 98%.        Temperature Range: Temp: 98.2 °F (36.8 °C) Temp  Av.1 °F (36.7 °C)  Min: 97.9 °F (36.6 °C)  Max: 98.4 °F (36.9 °C)  BP Range:  Systolic (76HLD), PTZ:436 , Min:93 , QZP:018     Diastolic (68OSZ), VSE:59, Min:24, Max:117    Pulse Range: Pulse  Av.7  Min: 62  Max: 117  Respiration Range: Resp  Av.7  Min: 14  Max: 35  Current Pulse Ox[de-identified]  SpO2: 98 %  24HR Pulse Ox Range:  SpO2  Av.9 %  Min: 88 %  Max: 100 %  Oxygen Amount and Delivery: O2 Flow Rate (L/min): 5 L/min    Wt Readings from Last 3 Encounters:   19 95 lb 7.4 oz (43.3 kg)   16 103 lb 9.9 oz (47 kg)   10/02/16 105 lb 13.1 oz (48 kg)     I/O       Intake/Output Summary (Last 24 hours) at 2019 1122  Last data filed at 2019 0526  Gross per 24 hour   Intake 349 ml   Output 100 ml   Net 249 ml     DRAIN/TUBE OUTPUT       Invasive Lines   ICP PRESSURE RANGE  No data recorded  CVP PRESSURE RANGE  No data recorded      Medications      enoxaparin  30 mg Subcutaneous Daily    sodium chloride flush  10 mL Intravenous 2 times per day    spironolactone  50 mg Oral Daily    metoprolol tartrate  25 mg Oral BID    ipratropium-albuterol  1 ampule Inhalation Q4H WA    cefTRIAXone (ROCEPHIN) IV  1 g Intravenous Q24H    furosemide  40 mg Intravenous Daily    guaiFENesin  600 mg Oral BID    diltiazem  30 mg Oral 4 times per day     ALPRAZolam, sodium chloride flush, acetaminophen, acetylcysteine, perflutren lipid microspheres, potassium chloride **OR** potassium alternative oral replacement **OR** potassium chloride  IV Drips/Infusions   dexmedetomidine (PRECEDEX) IV infusion 0.6 mcg/kg/hr (05/19/19 0942)       Diet/Nutrition   DIET GENERAL;  Dietary Nutrition Supplements: Standard High Calorie Oral Supplement    Exam      Constitutional - somnolent on Precedex drip  General Appearance  well developed, no distress  HEENT -normocephalic, atraumatic. PERRLA  Lungs - Chest expands equally, no wheezes, rales , coarse rhonchi. Cardiovascular - Heart sounds are normal.  normal rate and rhythm irregular, no murmur, gallop or rub.   Abdomen - soft, nontender, nondistended, no guarding  Neurologic - no restlessness or agitation noted   Skin - no bruising or bleeding  Extremities - no cyanosis, clubbing or edema    Lab Results   CBC     Lab Results   Component Value Date    WBC 12.1 05/17/2019    RBC 3.09 05/17/2019    RBC 4.91 09/22/2011    HGB 8.7 05/17/2019    HCT 27.1 05/17/2019     05/17/2019     09/22/2011    MCV 87.8 05/17/2019    MCH 28.1 05/17/2019    MCHC 32.0 05/17/2019    RDW 16.3 05/17/2019    LYMPHOPCT 10 05/17/2019    MONOPCT 10 05/17/2019    BASOPCT 1 05/17/2019    MONOSABS 0.90 05/17/2019    LYMPHSABS 0.90 05/17/2019    EOSABS 0.00 05/17/2019    BASOSABS 0.10 05/17/2019    DIFFTYPE NOT REPORTED 05/17/2019       BMP   Lab Results   Component Value Date     05/19/2019    K 4.7 05/19/2019    CL 86 05/19/2019    CO2 44 05/19/2019    BUN 20 05/19/2019    CREATININE 0.75 05/19/2019    GLUCOSE 133 05/19/2019    GLUCOSE 114 09/22/2011       LFTS  Lab Results   Component Value Date    ALKPHOS 111 pro-calcitonin unremarkable, recheck UA and cultures as well as CBC   diuresis. follow-up chest x-ray   on Lovenox  Discussed with staff, decrease Precedex drip and use Xanax when necessary.   ??  Need For psych/neuro evaluation       Electronically signed by Bonnie Stanton MD on 5/19/2019 at 11:22 AM

## 2019-05-19 NOTE — PROGRESS NOTES
Nutrition Assessment    Type and Reason for Visit: Initial(low BMI)    Nutrition Recommendations: Provide Ensure Enlive each meal to boost intake. Nutrition Assessment: Pt severely malnourished on admit as evidenced by very frail appearance, poor intake related to confusion/agitation with severe loss of subcutaneous fat and muscle mass. Provide a high calorie, high protein supplement each meal and monitor intake/acceptance. Malnutrition Assessment:  · Malnutrition Status: Meets the criteria for severe malnutrition  · Context: Acute illness or injury  · Findings of the 6 clinical characteristics of malnutrition (Minimum of 2 out of 6 clinical characteristics is required to make the diagnosis of moderate or severe Protein Calorie Malnutrition based on AND/ASPEN Guidelines):  1. Energy Intake-Less than or equal to 75% of estimated energy requirement, Unable to assess    2. Weight Loss-5% loss or greater, unable to assess  3. Fat Loss-Severe subcutaneous fat loss, Orbital  4. Muscle Loss-Severe muscle mass loss, Temples (temporalis muscle), Clavicles (pectoralis and deltoids)  5. Fluid Accumulation-(moderate fluid accumulation), Extremities  6.  Strength-Not measured    Nutrition Risk Level: High    Nutrient Needs:  · Estimated Daily Total Kcal: 1300 kcal based on 30 kcal/kg admission wt  · Estimated Daily Protein (g): 55-64 gm pro based on 1.3-1.5 gm/kg admission wt    Nutrition Diagnosis:   · Problem: Inadequate oral intake  · Etiology: related to Cognitive or neurological impairment, Impaired respiratory function-inability to consume food, Insufficient energy/nutrient consumption     Signs and symptoms:  as evidenced by Intake 0-25%, Weight loss, Severe loss of subcutaneous fat, Severe muscle loss    Objective Information:  · Nutrition-Focused Physical Findings: Edema; +2 pitting BLE's.  Cachectic appearance  · Wound Type: Stage II  · Current Nutrition Therapies:  · Oral Diet Orders: General   · Oral Diet intake: 0%, 1-25%  · Anthropometric Measures:  · Ht: 5' 2\" (157.5 cm)   · Admission Body Wt: 95 lb (43.1 kg)  · Usual Body Wt: 100 lb (45.4 kg)(from hx)  · % Weight Change:  ,  5%  · Ideal Body Wt: 110 lb (49.9 kg), % Ideal Body 86%  · BMI Classification: BMI <18.5 Underweight    Nutrition Interventions:   Continue current diet, Start ONS  Continued Inpatient Monitoring, Education Not Indicated    Nutrition Evaluation:   · Evaluation: Goals set   · Goals: Adequate nutrition intake or provision    · Monitoring: Meal Intake, Supplement Intake, Mental Status/Confusion, Pertinent Labs, Diet Tolerance, Skin Integrity, Wound Healing, I&O, Monitor Hemodynamic Status      Frank Watson R.D. LJAY.   Clinical Dietitian  Office: [phone]

## 2019-05-19 NOTE — PROGRESS NOTES
Pt crying out, \"Help me, I'm panicking! \" repeatedly. /90, RR 33, O2 sat 88% on NC 4L. RN attempts redirection, multiple repositionings, and demonstrates relaxation techniques. Patient status unchanged, precedex rate increased, see eMAR.

## 2019-05-19 NOTE — CARE COORDINATION
ONGOING DISCHARGE PLAN:    Spoke with patient regarding discharge plan and patient confirms that she is not coming from SNF, [facility], as listed in notes & is from home w/ Son . Writer did call [facility] & did confirm that she is not currently a resident there, but has been there before. Writer left message for Pt's Son Samantha Gao, to discuss DC needs. Remains on IV Rocephin/Lasix. CXR in AM.    Pulmonary/Cardio continue to follow. Will continue to follow for additional discharge needs.     Electronically signed by Edmar Moreno RN on 5/19/2019 at 4:02 PM

## 2019-05-19 NOTE — PROGRESS NOTES
Patient awake. Became anxious and ripped bipap off. Screaming I am having a panic attack. Xanax given and precidex adjusted accordingly.

## 2019-05-20 ENCOUNTER — APPOINTMENT (OUTPATIENT)
Dept: GENERAL RADIOLOGY | Age: 84
DRG: 177 | End: 2019-05-20
Payer: MEDICARE

## 2019-05-20 LAB
ABSOLUTE EOS #: 0 K/UL (ref 0–0.4)
ABSOLUTE IMMATURE GRANULOCYTE: ABNORMAL K/UL (ref 0–0.3)
ABSOLUTE LYMPH #: 0.9 K/UL (ref 1–4.8)
ABSOLUTE MONO #: 0.7 K/UL (ref 0.1–1.3)
ANION GAP SERPL CALCULATED.3IONS-SCNC: 9 MMOL/L (ref 9–17)
BASOPHILS # BLD: 1 % (ref 0–2)
BASOPHILS ABSOLUTE: 0 K/UL (ref 0–0.2)
BUN BLDV-MCNC: 26 MG/DL (ref 8–23)
BUN/CREAT BLD: ABNORMAL (ref 9–20)
CALCIUM SERPL-MCNC: 8.2 MG/DL (ref 8.6–10.4)
CHLORIDE BLD-SCNC: 85 MMOL/L (ref 98–107)
CO2: 45 MMOL/L (ref 20–31)
CREAT SERPL-MCNC: 0.61 MG/DL (ref 0.5–0.9)
CULTURE: ABNORMAL
DIFFERENTIAL TYPE: ABNORMAL
DIRECT EXAM: ABNORMAL
EOSINOPHILS RELATIVE PERCENT: 0 % (ref 0–4)
GFR AFRICAN AMERICAN: >60 ML/MIN
GFR NON-AFRICAN AMERICAN: >60 ML/MIN
GFR SERPL CREATININE-BSD FRML MDRD: ABNORMAL ML/MIN/{1.73_M2}
GFR SERPL CREATININE-BSD FRML MDRD: ABNORMAL ML/MIN/{1.73_M2}
GLUCOSE BLD-MCNC: 107 MG/DL (ref 70–99)
HCT VFR BLD CALC: 23.3 % (ref 36–46)
HEMOGLOBIN: 7.7 G/DL (ref 12–16)
IMMATURE GRANULOCYTES: ABNORMAL %
LYMPHOCYTES # BLD: 10 % (ref 24–44)
Lab: ABNORMAL
MCH RBC QN AUTO: 29.1 PG (ref 26–34)
MCHC RBC AUTO-ENTMCNC: 32.9 G/DL (ref 31–37)
MCV RBC AUTO: 88.7 FL (ref 80–100)
MONOCYTES # BLD: 8 % (ref 1–7)
NRBC AUTOMATED: ABNORMAL PER 100 WBC
PDW BLD-RTO: 17.1 % (ref 11.5–14.9)
PLATELET # BLD: 288 K/UL (ref 150–450)
PLATELET ESTIMATE: ABNORMAL
PMV BLD AUTO: 8.3 FL (ref 6–12)
POTASSIUM SERPL-SCNC: 3.8 MMOL/L (ref 3.7–5.3)
RBC # BLD: 2.63 M/UL (ref 4–5.2)
RBC # BLD: ABNORMAL 10*6/UL
SEG NEUTROPHILS: 81 % (ref 36–66)
SEGMENTED NEUTROPHILS ABSOLUTE COUNT: 7.4 K/UL (ref 1.3–9.1)
SODIUM BLD-SCNC: 139 MMOL/L (ref 135–144)
SPECIMEN DESCRIPTION: ABNORMAL
WBC # BLD: 9 K/UL (ref 3.5–11)
WBC # BLD: ABNORMAL 10*3/UL

## 2019-05-20 PROCEDURE — 97530 THERAPEUTIC ACTIVITIES: CPT

## 2019-05-20 PROCEDURE — 2700000000 HC OXYGEN THERAPY PER DAY

## 2019-05-20 PROCEDURE — 94640 AIRWAY INHALATION TREATMENT: CPT

## 2019-05-20 PROCEDURE — 6370000000 HC RX 637 (ALT 250 FOR IP): Performed by: INTERNAL MEDICINE

## 2019-05-20 PROCEDURE — 6360000002 HC RX W HCPCS: Performed by: FAMILY MEDICINE

## 2019-05-20 PROCEDURE — 94660 CPAP INITIATION&MGMT: CPT

## 2019-05-20 PROCEDURE — 2500000003 HC RX 250 WO HCPCS: Performed by: INTERNAL MEDICINE

## 2019-05-20 PROCEDURE — 2580000003 HC RX 258: Performed by: INTERNAL MEDICINE

## 2019-05-20 PROCEDURE — 6360000002 HC RX W HCPCS: Performed by: INTERNAL MEDICINE

## 2019-05-20 PROCEDURE — 2060000000 HC ICU INTERMEDIATE R&B

## 2019-05-20 PROCEDURE — 97162 PT EVAL MOD COMPLEX 30 MIN: CPT

## 2019-05-20 PROCEDURE — 80048 BASIC METABOLIC PNL TOTAL CA: CPT

## 2019-05-20 PROCEDURE — 36415 COLL VENOUS BLD VENIPUNCTURE: CPT

## 2019-05-20 PROCEDURE — 6370000000 HC RX 637 (ALT 250 FOR IP): Performed by: FAMILY MEDICINE

## 2019-05-20 PROCEDURE — 94761 N-INVAS EAR/PLS OXIMETRY MLT: CPT

## 2019-05-20 PROCEDURE — 2580000003 HC RX 258: Performed by: FAMILY MEDICINE

## 2019-05-20 PROCEDURE — 85025 COMPLETE CBC W/AUTO DIFF WBC: CPT

## 2019-05-20 PROCEDURE — 97166 OT EVAL MOD COMPLEX 45 MIN: CPT

## 2019-05-20 PROCEDURE — 71045 X-RAY EXAM CHEST 1 VIEW: CPT

## 2019-05-20 RX ORDER — LINEZOLID 2 MG/ML
600 INJECTION, SOLUTION INTRAVENOUS EVERY 12 HOURS
Status: DISCONTINUED | OUTPATIENT
Start: 2019-05-20 | End: 2019-05-26 | Stop reason: HOSPADM

## 2019-05-20 RX ORDER — METHYLPREDNISOLONE SODIUM SUCCINATE 125 MG/2ML
60 INJECTION, POWDER, LYOPHILIZED, FOR SOLUTION INTRAMUSCULAR; INTRAVENOUS EVERY 6 HOURS
Status: DISCONTINUED | OUTPATIENT
Start: 2019-05-20 | End: 2019-05-21

## 2019-05-20 RX ORDER — IPRATROPIUM BROMIDE AND ALBUTEROL SULFATE 2.5; .5 MG/3ML; MG/3ML
1 SOLUTION RESPIRATORY (INHALATION) EVERY 4 HOURS
Status: DISCONTINUED | OUTPATIENT
Start: 2019-05-20 | End: 2019-05-26 | Stop reason: HOSPADM

## 2019-05-20 RX ADMIN — IPRATROPIUM BROMIDE AND ALBUTEROL SULFATE 1 AMPULE: .5; 3 SOLUTION RESPIRATORY (INHALATION) at 23:53

## 2019-05-20 RX ADMIN — CEFTRIAXONE SODIUM 1 G: 1 INJECTION, POWDER, FOR SOLUTION INTRAMUSCULAR; INTRAVENOUS at 09:42

## 2019-05-20 RX ADMIN — ALPRAZOLAM 0.5 MG: 0.5 TABLET ORAL at 17:56

## 2019-05-20 RX ADMIN — Medication 10 ML: at 21:15

## 2019-05-20 RX ADMIN — ENOXAPARIN SODIUM 30 MG: 30 INJECTION SUBCUTANEOUS at 09:43

## 2019-05-20 RX ADMIN — NYSTATIN 500000 UNITS: 500000 SUSPENSION ORAL at 21:14

## 2019-05-20 RX ADMIN — LINEZOLID 600 MG: 600 INJECTION, SOLUTION INTRAVENOUS at 23:28

## 2019-05-20 RX ADMIN — METHYLPREDNISOLONE SODIUM SUCCINATE 60 MG: 125 INJECTION, POWDER, FOR SOLUTION INTRAMUSCULAR; INTRAVENOUS at 21:13

## 2019-05-20 RX ADMIN — QUETIAPINE FUMARATE 25 MG: 50 TABLET ORAL at 21:13

## 2019-05-20 RX ADMIN — IPRATROPIUM BROMIDE AND ALBUTEROL SULFATE 1 AMPULE: .5; 3 SOLUTION RESPIRATORY (INHALATION) at 07:54

## 2019-05-20 RX ADMIN — METOPROLOL TARTRATE 25 MG: 25 TABLET ORAL at 09:43

## 2019-05-20 RX ADMIN — FUROSEMIDE 20 MG: 10 INJECTION, SOLUTION INTRAMUSCULAR; INTRAVENOUS at 09:44

## 2019-05-20 RX ADMIN — METOPROLOL TARTRATE 25 MG: 25 TABLET ORAL at 21:13

## 2019-05-20 RX ADMIN — IPRATROPIUM BROMIDE AND ALBUTEROL SULFATE 1 AMPULE: .5; 3 SOLUTION RESPIRATORY (INHALATION) at 20:09

## 2019-05-20 RX ADMIN — IPRATROPIUM BROMIDE AND ALBUTEROL SULFATE 1 AMPULE: .5; 3 SOLUTION RESPIRATORY (INHALATION) at 12:00

## 2019-05-20 RX ADMIN — IPRATROPIUM BROMIDE AND ALBUTEROL SULFATE 1 AMPULE: .5; 3 SOLUTION RESPIRATORY (INHALATION) at 15:36

## 2019-05-20 RX ADMIN — ALPRAZOLAM 0.5 MG: 0.5 TABLET ORAL at 21:13

## 2019-05-20 RX ADMIN — LINEZOLID 600 MG: 600 INJECTION, SOLUTION INTRAVENOUS at 16:21

## 2019-05-20 RX ADMIN — METHYLPREDNISOLONE SODIUM SUCCINATE 60 MG: 125 INJECTION, POWDER, FOR SOLUTION INTRAMUSCULAR; INTRAVENOUS at 16:22

## 2019-05-20 RX ADMIN — NYSTATIN 500000 UNITS: 500000 SUSPENSION ORAL at 16:27

## 2019-05-20 RX ADMIN — GUAIFENESIN 600 MG: 600 TABLET, EXTENDED RELEASE ORAL at 21:13

## 2019-05-20 RX ADMIN — SPIRONOLACTONE 50 MG: 25 TABLET, FILM COATED ORAL at 09:43

## 2019-05-20 RX ADMIN — GUAIFENESIN 600 MG: 600 TABLET, EXTENDED RELEASE ORAL at 09:43

## 2019-05-20 RX ADMIN — DEXMEDETOMIDINE HYDROCHLORIDE 0.4 MCG/KG/HR: 100 INJECTION, SOLUTION INTRAVENOUS at 03:53

## 2019-05-20 RX ADMIN — METHYLPREDNISOLONE SODIUM SUCCINATE 60 MG: 125 INJECTION, POWDER, FOR SOLUTION INTRAMUSCULAR; INTRAVENOUS at 12:44

## 2019-05-20 ASSESSMENT — PAIN SCALES - GENERAL
PAINLEVEL_OUTOF10: 0
PAINLEVEL_OUTOF10: 0

## 2019-05-20 NOTE — PLAN OF CARE
Problem: Falls - Risk of:  Goal: Will remain free from falls  Description  Will remain free from falls  5/20/2019 0346 by Sam Langston RN  Outcome: Met This Shift  Note:   Bed alarm continues, pt requires frequent reminding of wherabouts  5/20/2019 0328 by Sam Langston RN  Outcome: Ongoing  Note:   Bed alarm on, pt reminded often of need to stay in bed unless calling for assistance    Problem: Falls - Risk of:  Goal: Absence of physical injury  Description  Absence of physical injury  5/20/2019 0346 by Sam Langston RN  Outcome: Met This shift  Problem: Airway Clearance - Ineffective:  Goal: Ability to maintain a clear airway will improve  Description  Ability to maintain a clear airway will improve  5/20/2019 0346 by Sam Langston RN  Outcome: Ongoing  Note:   Pt continues with congested nonproductive cough, CXR for AM, pulse ox stable    Problem: Anxiety:  Goal: Level of anxiety will decrease  Description  Level of anxiety will decrease  5/20/2019 0346 by Sam Langston RN  Outcome: Ongoing  Note:   Precedex gtt continues at low dose, xanax ordered prn, pt reassured frequently  5/20/2019 0328 by Sam Langston RN  Outcome: Ongoing  Note:   Precedex gtt continues , pt needs frequent reassurance

## 2019-05-20 NOTE — PROGRESS NOTES
ICU Progress Note (Non-Vent)  Pulmonary and Critical Care Specialists    Patient - Giselle Kaur,  Age - 80 y.o.    - 10/15/1933      Room Number -    N -  [id number]   [facility]  Date of Admission -  2019 12:32 AM    Events of Past 24 Hours   She is alert and oriented, warm BiPAP overnight    Vitals    height is 5' 2\" (1.575 m) and weight is 95 lb 7.4 oz (43.3 kg). Her oral temperature is 97.7 °F (36.5 °C). Her blood pressure is 112/52 (abnormal) and her pulse is 93. Her respiration is 16 and oxygen saturation is 91%.        Temperature Range: Temp: 97.7 °F (36.5 °C) Temp  Av.9 °F (36.6 °C)  Min: 97.7 °F (36.5 °C)  Max: 98.1 °F (36.7 °C)  BP Range:  Systolic (31PVU), MEM:729 , Min:86 , OS     Diastolic (09ZLL), HUU:36, Min:24, Max:71    Pulse Range: Pulse  Av  Min: 60  Max: 97  Respiration Range: Resp  Av.6  Min: 12  Max: 36  Current Pulse Ox[de-identified]  SpO2: 91 %  24HR Pulse Ox Range:  SpO2  Av.3 %  Min: 90 %  Max: 100 %  Oxygen Amount and Delivery: O2 Flow Rate (L/min): 4 L/min    Wt Readings from Last 3 Encounters:   19 95 lb 7.4 oz (43.3 kg)   16 103 lb 9.9 oz (47 kg)   10/02/16 105 lb 13.1 oz (48 kg)     I/O       Intake/Output Summary (Last 24 hours) at 2019 0934  Last data filed at 2019 0530  Gross per 24 hour   Intake 633.48 ml   Output 300 ml   Net 333.48 ml     DRAIN/TUBE OUTPUT       Invasive Lines   ICP PRESSURE RANGE  No data recorded  CVP PRESSURE RANGE  No data recorded      Medications      QUEtiapine  25 mg Oral Nightly    furosemide  20 mg Intravenous Daily    enoxaparin  30 mg Subcutaneous Daily    sodium chloride flush  10 mL Intravenous 2 times per day    spironolactone  50 mg Oral Daily    metoprolol tartrate  25 mg Oral BID    ipratropium-albuterol  1 ampule Inhalation Q4H WA    cefTRIAXone (ROCEPHIN) IV  1 g Intravenous Q24H    guaiFENesin  600 mg Oral BID     ALPRAZolam, diltiazem, sodium chloride flush, acetaminophen, acetylcysteine, perflutren lipid microspheres, potassium chloride **OR** potassium alternative oral replacement **OR** potassium chloride  IV Drips/Infusions   dexmedetomidine (PRECEDEX) IV infusion 0.4 mcg/kg/hr (05/20/19 0353)       Diet/Nutrition   DIET GENERAL;  Dietary Nutrition Supplements: Standard High Calorie Oral Supplement    Exam      Constitutional - Alert, arousable  General Appearance  thin and frail   HEENT -normocephalic, atraumatic. PERRLA  Lungs - Chest expands equally, no wheezes, decreased breath sounds with crackles at the bases  Cardiovascular - Heart sounds are normal.  normal rate and rhythm regular, no murmur, gallop or rub. Abdomen - soft, nontender, nondistended, no masses or organomegaly  Neurologic - CN II-XII are grossly intact.  There are no focal motor deficits  Skin - no bruising or bleeding  Extremities - no cyanosis, clubbing or edema    Lab Results   CBC     Lab Results   Component Value Date    WBC 9.0 05/20/2019    RBC 2.63 05/20/2019    RBC 4.91 09/22/2011    HGB 7.7 05/20/2019    HCT 23.3 05/20/2019     05/20/2019     09/22/2011    MCV 88.7 05/20/2019    MCH 29.1 05/20/2019    MCHC 32.9 05/20/2019    RDW 17.1 05/20/2019    LYMPHOPCT 10 05/20/2019    MONOPCT 8 05/20/2019    BASOPCT 1 05/20/2019    MONOSABS 0.70 05/20/2019    LYMPHSABS 0.90 05/20/2019    EOSABS 0.00 05/20/2019    BASOSABS 0.00 05/20/2019    DIFFTYPE NOT REPORTED 05/20/2019       BMP   Lab Results   Component Value Date     05/20/2019    K 3.8 05/20/2019    CL 85 05/20/2019    CO2 45 05/20/2019    BUN 26 05/20/2019    CREATININE 0.61 05/20/2019    GLUCOSE 107 05/20/2019    GLUCOSE 114 09/22/2011       LFTS  Lab Results   Component Value Date    ALKPHOS 111 05/17/2019    ALT 10 05/17/2019    AST 18 05/17/2019    PROT 6.9 05/17/2019    BILITOT 0.41 05/17/2019    LABALBU 3.2 05/17/2019       ABG ABGs:   Lab Results Component Value Date    PHART 7.568 05/18/2019    PO2ART 51.2 05/18/2019    ZZK6RFV 57.3 05/18/2019       Lab Results   Component Value Date    MODE NOT REPORTED 05/18/2019         INR  No results for input(s): PROTIME, INR in the last 72 hours. APTT  No results for input(s): APTT in the last 72 hours. Lactic Acid  Lab Results   Component Value Date    LACTA 1.1 09/27/2016    LACTA 2.0 09/03/2016        BNP   No results for input(s): BNP in the last 72 hours. Cultures       Radiology     CXR      Increased interstitial markings bilaterally     SYSTEMS ASSESSMENT  Acute on chronic hypoxic respiratory failure with chronic hypercapnia  Congestive heart failure with diastolic dysfunction    Neuro   Use Precedex drip only if needed to combat agitation with BiPAP    Respiratory   Wean oxygen as tolerated. Keep O2 sat > 88%  She has more hypoxic respiratory failure rather than acute hypercapnic.   Okay to leave off BiPAP during the day, can use at night if needed  Increase aerosols to every 4 hours  Place on IV Solu-Medrol, I suspect she has moderate to severe COPD    Cardiovascular   Continue IV Lasix, chest x-ray consistent with volume overload  Surprisingly RVSP is not significantly elevated     Gastrointestinal   She is trying to eat    Renal   Continue diuresis    Infectious Disease   Keep on Rocephin and Zithromax    Hematology/Oncology   Continue Lovenox, no active bleeding but watch hemoglobin    Endocrine   Blood sugars are okay    Social/Spiritual/DNR/Disposition/Other     Keep in intermediate unit    Critical Care Time   0 min    Electronically signed by Noemi Hernández MD on 5/20/2019 at 9:34 AM

## 2019-05-20 NOTE — CARE COORDINATION
CASE MANAGEMENT NOTE:    Admission Date:  5/17/2019 Donis Alvares is a 80 y.o.  female    Admitted for : COPD (chronic obstructive pulmonary disease) ([facility] [address] [J44.9]  COPD (chronic obstructive pulmonary disease) ([facility]) [J44.9]  COPD (chronic obstructive pulmonary disease) ([facility] [address] [J44.9]    Met with:  Family - Spoke with son Rufino Loyola on phone    PCP:  Dr. Milind Boss:  Medicare      Current Residence/ Living Arrangements:  at home dependent on family care             Current Services PTA:  No    Is patient agreeable to VNS: Yes    Freedom of choice provided: Yes    List of [address] provided: Yes    VNS chosen:  No    DME:  walker    Home Oxygen: Yes    Nebulizer: No    CPAP/BIPAP: No    Supplier: [employer]    Potential Assistance Needed: Yes    SNF needed: Yes - Does not want [address] [facility]:  [employer]       Is the Patient an M[employer] with Readmission Risk Score greater than 14%? No  If yes, pt needs a follow up appointment made within 7 days. Does Patient want to use MEDS to BEDS? No    Family Members/Caregivers that pt would like involved in their care:    Yes    If yes, list name here:  Kevin Coles    Transportation Provider:  Patient and Family             Is patient in Isolation/One on One/Altered Mental Status? Yes  If yes, skip next question. If no, would they like an I-Pad to  use? No  If yes, call [phone]. Discharge Plan:  5/20: MEDICARE - Patient is from 2nd floor apartment with many stairs - Per son, currently unable to go up the stairs. DME - [employer]line[employer] and home O2 24/7 through [facility] [employer]. Agreeable to SNF if needed - Does not want [address] [place] [address] to see patient for choice. Awaiting PT/OT recs. On IV rocephin and IV Zyvox, steroids 60Q6 and IV lasix daily. Bipap at hs and precedex PRN. Will continue to follow.  MAXINE NEEDS SIGNED/COMPLETED. Mar Mascorro                  Electronically signed by: Abraham Monsalve RN

## 2019-05-20 NOTE — PROGRESS NOTES
Physical Therapy    Facility/Department: [facility] ICU  Initial Assessment    NAME: Hailey Sharpe  : 10/15/1933  MRN: [id number]    Date of Service: 2019    Discharge Recommendations:  Subacute/Skilled Nursing Facility, Continue to assess pending progress        Assessment   Body structures, Functions, Activity limitations: Decreased functional mobility ; Decreased strength;Decreased endurance;Decreased balance  Assessment: Patient presents with decreased functional mobility due to decreased strength (grossly 4/5 BLE), transfer ability (mod assist for supine to sit, min assist for scooting, and SBA for sit to supine) and unable to assess sit to stand transfers or gait due to fluctuating O2 levels via NC  Treatment Diagnosis: Decreased functional mobility  Specific instructions for Next Treatment: FALL RISK, on 3L O2, assess gait and stairs when O2 levels are stable  Prognosis: Fair  Decision Making: Medium Complexity  History: per hx  Exam: ROM, MMT, sitting balance, and bed mobility  Clinical Presentation: Patient presents in bed with O2 via NC  Patient Education: per POC (safety and mobility education)  Barriers to Learning: None  REQUIRES PT FOLLOW UP: Yes  Activity Tolerance  Activity Tolerance: Patient limited by fatigue;Patient limited by endurance       Patient Diagnosis(es): The encounter diagnosis was Hypoxia. has a past medical history of Arthritis, Atrial fibrillation ([address] COPD (chronic obstructive pulmonary disease) ([address] Diverticulitis, Hypertension, MI, old, and Thyroid disease. has a past surgical history that includes hernia repair; Abdomen surgery; Cardiac surgery; and Bunionectomy (Left).     Restrictions  Restrictions/Precautions  Restrictions/Precautions: Isolation, Fall Risk, Up as Tolerated  Required Braces or Orthoses?: No  Implants present? : (none)   KEEP O2 SATS ABOVE 88%    Vision/Hearing  Vision: Impaired  Vision Exceptions: Wears glasses for reading  Hearing: Exceptions to WFL  Hearing Exceptions: Hard of hearing/hearing concerns     Subjective  General  Patient assessed for rehabilitation services?: Yes  Additional Pertinent Hx: This 49-year-old female, presents with recent onset of increasing shortness of breath. She has been coughing a lot more and bringing up copious amount of mucus. Her past medical history consists of COPD, essential hypertension, atrial fibrillation, and coronary artery disease. Response To Previous Treatment: Not applicable  Family / Caregiver Present: No  Referring Practitioner: Kadeem Roque MD  Referral Date : 05/20/19  Diagnosis: COPD  Follows Commands: Within Functional Limits  Other (Comment): Okay per nurse Eduardo Lieu for bed side eval  General Comment  Comments: Patient presents in bed, with O2 via NC  Subjective  Subjective: Patient reports having trouble breathing and reports when she coughs it is hard for her to cough the sputum out. She reports it goes right back down.   Pain Screening  Patient Currently in Pain: Denies  Pain Assessment  Pain Assessment: 0-10  Pain Level: 0  Vital Signs  Patient Currently in Pain: Denies       Orientation  Orientation  Overall Orientation Status: Within Functional Limits  Social/Functional History  Social/Functional History  Lives With: Son(granddaughter)  Type of Home: Apartment  Home Layout: One level(on second floor)  Home Access: Stairs to enter with rails(full flight of steps)  Entrance Stairs - Number of Steps: ~13  Entrance Stairs - Rails: None  Bathroom Shower/Tub: Tub/Shower unit, Curtain(Pt states that she takes tub baths but hasn't recently)  Bathroom Toilet: Standard  Bathroom Equipment: (NO DME)  Bathroom Accessibility: Accessible  Home Equipment: Standard walker, Oxygen(3 to 4L all the time; sleeps on the couch)  Receives Help From: Family(son and granddaughter)  ADL Assistance: Independent  Homemaking Assistance: Needs assistance(Family helps cook and clean)  Homemaking Responsibilities: No  Ambulation Assistance: Independent(RW to ambulate)  Transfer Assistance: Independent(was independent about a month ago and now needs some assist)  Active : Yes  Mode of Transportation: Car  Occupation: Retired  Type of occupation:  at [employer] at [address]  Additional Comments: Pt reports that someone is always home with her, son takes care of grandkids at the apartment.   Cognition        Objective          AROM RLE (degrees)  RLE AROM: WFL  AROM LLE (degrees)  LLE AROM : WFL  AROM RUE (degrees)  RUE AROM : WFL  RUE General AROM: See OT assessment  AROM LUE (degrees)  LUE AROM : WFL  LUE General AROM: See OT assessment  Strength RLE  Strength RLE: WFL  Comment: Grossly 4/5  Strength LLE  Strength LLE: WFL  Comment: Grossly 4/5  Strength RUE  Strength RUE: WFL  Comment: See OT assessment  Strength LUE  Strength LUE: WFL  Comment: See OT assessment     Sensation  Overall Sensation Status: WFL  Bed mobility  Supine to Sit: Moderate assistance  Sit to Supine: Stand by assistance  Scooting: Minimal assistance  Comment: Dangling at the EOB with supervision  Transfers  Sit to Stand: Unable to assess(d/t Patient's O2 levels were fluctuating & defered per nurse)  Stand to sit: Unable to assess(d/t Patient's O2 levels were fluctuating & defered per nurse)  Ambulation  Ambulation?: No  Stairs/Curb  Stairs?: No     Balance  Posture: Fair  Sitting - Static: Fair;+  Sitting - Dynamic: Fair;-  Standing - Static: (NT)  Standing - Dynamic: (NT)        Plan   Plan  Times per week: 5-7 treatments/ week  Specific instructions for Next Treatment: FALL RISK, on 3L O2, assess gait and stairs when O2 levels are stable  Current Treatment Recommendations: Strengthening, Balance Training, Functional Mobility Training, Transfer Training, Endurance Training, Gait Training, Home Exercise Program, Safety Education & Training, Patient/Caregiver Education & Training  Safety Devices  Type of devices: Gait belt, Bed alarm in place, Call light within reach, All fall risk precautions in place, Patient at risk for falls, Left in bed    G-Code       OutComes Score                                                  AM-PAC Score  AM-PAC Inpatient Mobility Raw Score : 9  AM-PAC Inpatient T-Scale Score : 30.55  Mobility Inpatient CMS 0-100% Score: 81.38  Mobility Inpatient CMS G-Code Modifier : CM          Goals  Short term goals  Time Frame for Short term goals: 5-7 treatments/week  Short term goal 1: Patient will demonstrate ability to move from supine to sit from Mod A of 1 to CGA inorder to improve independence at home   Short term goal 2: Patient will demonstrate ability to transfer from sit to supine MOD I inorder to improve independence at home  Short term goal 3: Patient will demonstrate increased ability to sit at the EOB for 10 minutes with supervision in order to improve trunk stability and core strength  Short term goal 4: Patient will demonstrate ability to transfer from sit to  order to improve functional mobility  Short term goal 5: Patient will demonstrate ability to walk 10 to 20ft with RW and mod assist of 1 with O2 at 3-4L via NC in order to improve functional mobility in the home   Short term goal 6: Patient will demonstrate ability to ascend and descend a 4 to 6 inch platform step with mod assist of 1 inorder to improve ability to negotiate stairs  Patient Goals   Patient goals : Patient's goal is to be able to cough up sputum and to be more mobile       Therapy Time   Individual Concurrent Group Co-treatment   Time In 1324         Time Out 1349         Minutes 25         Timed Code Treatment Minutes: 9 Minutes     The above documentation completed by Student Physical Therapist Jose Trujillo was reviewed and accepted by the Supervising Clinical instructor.                         SLADE Nguyen     The above documentation completed by Student Physical Therapist Jose Trujillo   was reviewed and accepted by the supervising Clinical instructor [facility], PT.

## 2019-05-20 NOTE — CARE COORDINATION
Writer discussed d/c plan with the pt. She agreed she needed a snf. She wanted to stay on the [place] [id number] side but did not want to go to [facility]. writer offered her choices and she decided on [facility]. Writer made referral and faxed facesheet.

## 2019-05-20 NOTE — PROGRESS NOTES
[facility]  Progress Note    5/19/2019 8:10 PM  Subjective: Interval History: Pt has had episodes of bradycardia,her oral intake is low, her breathing and MS are improving. Pt is off precedex and on prn Xanax. Diet: DIET GENERAL;  Dietary Nutrition Supplements: Standard High Calorie Oral Supplement    Medications:   Reviewed medications    Labs:   CBC:   Recent Labs     05/17/19  0511   WBC 12.1*   HGB 8.7*        BMP:    Recent Labs     05/19/19  0421      K 4.7   CL 86*   CO2 44*   BUN 20   CREATININE 0.75   GLUCOSE 133*     Hepatic:   Recent Labs     05/17/19  0045   AST 18   ALT 10   BILITOT 0.41   ALKPHOS 111*     Troponin: No results for input(s): TROPONINI in the last 72 hours. BNP: No results for input(s): BNP in the last 72 hours. Lipids: No results for input(s): CHOL, HDL in the last 72 hours.     Invalid input(s): LDLCALCU  INR:   Recent Labs     05/17/19 0045   INR 0.9         Objective:   Vitals: BP (!) 111/49   Pulse 80   Temp 98 °F (36.7 °C)   Resp 20   Ht 5' 2\" (1.575 m)   Wt 95 lb 7.4 oz (43.3 kg)   SpO2 98%   BMI 17.46 kg/m²   General appearance: alert and cooperative with exam  Neck: no adenopathy, no carotid bruit, no JVD, supple, symmetrical, trachea midline and thyroid not enlarged, symmetric, no tenderness/mass/nodules  Lungs: Not clear to auscultation bilaterally, few ronchi  Heart: Irregular rate and rhythm, S1, S2 normal, no murmur, click, rub or gallop  Abdomen: soft, non-tender; bowel sounds normal; no masses,  no organomegaly  Extremities: extremities normal, atraumatic, no cyanosis or edema  Neurologic: Mental status: Alert, oriented, thought content appropriate    Assessment:     Patient Active Problem List    Diagnosis Date Noted    Severe malnutrition ([address] 05/19/2019    COPD (chronic obstructive pulmonary disease) ([facility] [address] 05/17/2019    Hyperkalemia 10/01/2016    Anemia 09/29/2016    Hypokalemia 09/29/2016    Enterococcus UTI 09/29/2016   

## 2019-05-20 NOTE — PROGRESS NOTES
to [facility]  Hearing Exceptions: Hard of hearing/hearing concerns  Social/Functional History  Lives With: Son(granddaughter)  Type of Home: Apartment  Home Layout: One level(on second floor)  Home Access: Stairs to enter with rails(full flight of steps)  Entrance Stairs - Number of Steps: ~13  Entrance Stairs - Rails: None  Bathroom Shower/Tub: Tub/Shower unit, Curtain(Pt states that she takes tub baths but hasn't recently)  Bathroom Toilet: Standard  Bathroom Equipment: (NO DME)  Bathroom Accessibility: Accessible  Home Equipment: Standard walker, Oxygen(3 to 4L all the time; sleeps on the couch)  Receives Help From: Family(son and granddaughter)  ADL Assistance: Independent  Homemaking Assistance: Needs assistance(Family helps cook and clean)  Homemaking Responsibilities: No  Ambulation Assistance: Independent(RW to ambulate)  Transfer Assistance: Independent(was independent about a month ago and now needs some assist)  Active : Yes  Mode of Transportation: Car  Occupation: Retired  Type of occupation:  at [employer] at [address]  Additional Comments: Pt reports that someone is always home with her, son takes care of grandkids at the apartment. Objective  Vision - Basic Assessment  Patient Visual Report: No visual complaint reported. Cognition  Overall Cognitive Status: WFL   Perception  Overall Perceptual Status: WFL  Sensation  Overall Sensation Status: WFL   ADL  Feeding: Setup, Stand by assistance(2 hands to manage cups; difficulty reaching food off tray)  Grooming: Stand by assistance  UE Bathing: Stand by assistance  LE Bathing: Moderate assistance  UE Dressing: Stand by assistance  LE Dressing: Maximum assistance  Toileting: Dependent/Total(External female catheter in place)  Additional Comments: Based on pt report, skilled observation, and clinical reasoning.     UE Function  LUE Strength  Gross LUE Strength: Exceptions to [facility]  L Hand Grasp: 4+/5  LUE Strength Comment: Patient limited by fatigue    Goals  Patient Goals   Patient goals : Return home with A from family, may consider SNF if necessary pending progress. Short term goals  Time Frame for Short term goals: By Discharge  Short term goal 1: Pt will complete bed mobility with supervision and tolerate sitting EOB for 5-10 minutes unsupported while completing a functional task. Short term goal 2: Pt will actively participate in self-care routine and complete UB with set-up A only and LB with Min A using AE if appropriate. Short term goal 3: Pt will complete toilet transfer and toileting with Min A and Good safety using appropriate DME. Short term goal 4: Pt will V/D good understanding of EC/WS techniques that she can utilize during daily routines. Short term goal 5: Pt will actively participate in 15-20 minutes of therapeutic exercise/activity to promote increased independence and safety with self-care and mobility.     Plan  Safety Devices  Safety Devices in place: Yes  Type of devices: Patient at risk for falls, Left in bed, Call light within reach, Bed alarm in place     Plan  Times per week: 5-7  Times per day: Daily  Current Treatment Recommendations: Strengthening, Balance Training, Functional Mobility Training, Endurance Training, Safety Education & Training, Patient/Caregiver Education & Training, Equipment Evaluation, Education, & procurement, Self-Care / ADL    Equipment Recommendations  Equipment Needed: (TBD)  OT Individual Minutes  Time In: 6530  Time Out: 9525  Minutes: 25    Electronically signed by Angy Fox on 5/20/19 at 2:33 PM

## 2019-05-21 LAB
ANION GAP SERPL CALCULATED.3IONS-SCNC: 6 MMOL/L (ref 9–17)
BNP INTERPRETATION: ABNORMAL
BUN BLDV-MCNC: 20 MG/DL (ref 8–23)
BUN/CREAT BLD: ABNORMAL (ref 9–20)
CALCIUM SERPL-MCNC: 8.4 MG/DL (ref 8.6–10.4)
CHLORIDE BLD-SCNC: 85 MMOL/L (ref 98–107)
CO2: 43 MMOL/L (ref 20–31)
CREAT SERPL-MCNC: 0.56 MG/DL (ref 0.5–0.9)
GFR AFRICAN AMERICAN: >60 ML/MIN
GFR NON-AFRICAN AMERICAN: >60 ML/MIN
GFR SERPL CREATININE-BSD FRML MDRD: ABNORMAL ML/MIN/{1.73_M2}
GFR SERPL CREATININE-BSD FRML MDRD: ABNORMAL ML/MIN/{1.73_M2}
GLUCOSE BLD-MCNC: 195 MG/DL (ref 70–99)
POTASSIUM SERPL-SCNC: 4.3 MMOL/L (ref 3.7–5.3)
PRO-BNP: ABNORMAL PG/ML
SODIUM BLD-SCNC: 134 MMOL/L (ref 135–144)

## 2019-05-21 PROCEDURE — 2580000003 HC RX 258: Performed by: INTERNAL MEDICINE

## 2019-05-21 PROCEDURE — 2700000000 HC OXYGEN THERAPY PER DAY

## 2019-05-21 PROCEDURE — 94761 N-INVAS EAR/PLS OXIMETRY MLT: CPT

## 2019-05-21 PROCEDURE — 6360000002 HC RX W HCPCS: Performed by: FAMILY MEDICINE

## 2019-05-21 PROCEDURE — 2580000003 HC RX 258: Performed by: FAMILY MEDICINE

## 2019-05-21 PROCEDURE — 6370000000 HC RX 637 (ALT 250 FOR IP): Performed by: FAMILY MEDICINE

## 2019-05-21 PROCEDURE — 6370000000 HC RX 637 (ALT 250 FOR IP): Performed by: INTERNAL MEDICINE

## 2019-05-21 PROCEDURE — 6360000002 HC RX W HCPCS: Performed by: INTERNAL MEDICINE

## 2019-05-21 PROCEDURE — 2060000000 HC ICU INTERMEDIATE R&B

## 2019-05-21 PROCEDURE — 97110 THERAPEUTIC EXERCISES: CPT

## 2019-05-21 PROCEDURE — 36415 COLL VENOUS BLD VENIPUNCTURE: CPT

## 2019-05-21 PROCEDURE — 97116 GAIT TRAINING THERAPY: CPT

## 2019-05-21 PROCEDURE — 80048 BASIC METABOLIC PNL TOTAL CA: CPT

## 2019-05-21 PROCEDURE — 94640 AIRWAY INHALATION TREATMENT: CPT

## 2019-05-21 PROCEDURE — 83880 ASSAY OF NATRIURETIC PEPTIDE: CPT

## 2019-05-21 PROCEDURE — 97530 THERAPEUTIC ACTIVITIES: CPT

## 2019-05-21 PROCEDURE — 94660 CPAP INITIATION&MGMT: CPT

## 2019-05-21 RX ORDER — METHYLPREDNISOLONE SODIUM SUCCINATE 40 MG/ML
40 INJECTION, POWDER, LYOPHILIZED, FOR SOLUTION INTRAMUSCULAR; INTRAVENOUS EVERY 8 HOURS
Status: DISCONTINUED | OUTPATIENT
Start: 2019-05-21 | End: 2019-05-23

## 2019-05-21 RX ADMIN — CEFTRIAXONE SODIUM 1 G: 1 INJECTION, POWDER, FOR SOLUTION INTRAMUSCULAR; INTRAVENOUS at 08:35

## 2019-05-21 RX ADMIN — ALPRAZOLAM 0.5 MG: 0.5 TABLET ORAL at 21:55

## 2019-05-21 RX ADMIN — IPRATROPIUM BROMIDE AND ALBUTEROL SULFATE 1 AMPULE: .5; 3 SOLUTION RESPIRATORY (INHALATION) at 21:08

## 2019-05-21 RX ADMIN — METOPROLOL TARTRATE 25 MG: 25 TABLET ORAL at 21:55

## 2019-05-21 RX ADMIN — IPRATROPIUM BROMIDE AND ALBUTEROL SULFATE 1 AMPULE: .5; 3 SOLUTION RESPIRATORY (INHALATION) at 16:28

## 2019-05-21 RX ADMIN — METHYLPREDNISOLONE SODIUM SUCCINATE 40 MG: 40 INJECTION, POWDER, FOR SOLUTION INTRAMUSCULAR; INTRAVENOUS at 15:25

## 2019-05-21 RX ADMIN — FUROSEMIDE 20 MG: 10 INJECTION, SOLUTION INTRAMUSCULAR; INTRAVENOUS at 08:35

## 2019-05-21 RX ADMIN — GUAIFENESIN 600 MG: 600 TABLET, EXTENDED RELEASE ORAL at 21:55

## 2019-05-21 RX ADMIN — ALPRAZOLAM 0.5 MG: 0.5 TABLET ORAL at 15:25

## 2019-05-21 RX ADMIN — NYSTATIN 500000 UNITS: 500000 SUSPENSION ORAL at 10:35

## 2019-05-21 RX ADMIN — IPRATROPIUM BROMIDE AND ALBUTEROL SULFATE 1 AMPULE: .5; 3 SOLUTION RESPIRATORY (INHALATION) at 07:59

## 2019-05-21 RX ADMIN — Medication 10 ML: at 04:09

## 2019-05-21 RX ADMIN — QUETIAPINE FUMARATE 25 MG: 50 TABLET ORAL at 21:55

## 2019-05-21 RX ADMIN — METHYLPREDNISOLONE SODIUM SUCCINATE 60 MG: 125 INJECTION, POWDER, FOR SOLUTION INTRAMUSCULAR; INTRAVENOUS at 08:35

## 2019-05-21 RX ADMIN — METHYLPREDNISOLONE SODIUM SUCCINATE 60 MG: 125 INJECTION, POWDER, FOR SOLUTION INTRAMUSCULAR; INTRAVENOUS at 04:09

## 2019-05-21 RX ADMIN — METOPROLOL TARTRATE 25 MG: 25 TABLET ORAL at 08:35

## 2019-05-21 RX ADMIN — IPRATROPIUM BROMIDE AND ALBUTEROL SULFATE 1 AMPULE: .5; 3 SOLUTION RESPIRATORY (INHALATION) at 12:50

## 2019-05-21 RX ADMIN — MEROPENEM 1 G: 1 INJECTION, POWDER, FOR SOLUTION INTRAVENOUS at 21:56

## 2019-05-21 RX ADMIN — LINEZOLID 600 MG: 600 INJECTION, SOLUTION INTRAVENOUS at 10:35

## 2019-05-21 RX ADMIN — NYSTATIN 500000 UNITS: 500000 SUSPENSION ORAL at 21:55

## 2019-05-21 RX ADMIN — GUAIFENESIN 600 MG: 600 TABLET, EXTENDED RELEASE ORAL at 08:35

## 2019-05-21 RX ADMIN — MEROPENEM 1 G: 1 INJECTION, POWDER, FOR SOLUTION INTRAVENOUS at 15:24

## 2019-05-21 RX ADMIN — Medication 10 ML: at 21:55

## 2019-05-21 RX ADMIN — SPIRONOLACTONE 50 MG: 25 TABLET, FILM COATED ORAL at 08:35

## 2019-05-21 RX ADMIN — NYSTATIN 500000 UNITS: 500000 SUSPENSION ORAL at 12:44

## 2019-05-21 RX ADMIN — ALPRAZOLAM 0.5 MG: 0.5 TABLET ORAL at 08:35

## 2019-05-21 RX ADMIN — ENOXAPARIN SODIUM 30 MG: 30 INJECTION SUBCUTANEOUS at 08:35

## 2019-05-21 ASSESSMENT — PAIN SCALES - GENERAL
PAINLEVEL_OUTOF10: 0

## 2019-05-21 NOTE — PROGRESS NOTES
Nutrition Assessment    Type and Reason for Visit: Reassess    Nutrition Recommendations: Continue current diet and oral nutrition supplements. Nutrition Assessment: Pt gradually improving from a nutrition viewpoint as evidenced by increasing oral intake. Pt remains at risk for nutrition compromise due to severe malnutrition on admission. Will continue current diet and oral nutrition supplements. Malnutrition Assessment:  · Malnutrition Status: Meets the criteria for severe malnutrition  · Context: Acute illness or injury  · Findings of the 6 clinical characteristics of malnutrition (Minimum of 2 out of 6 clinical characteristics is required to make the diagnosis of moderate or severe Protein Calorie Malnutrition based on AND/ASPEN Guidelines):  1. Energy Intake-Less than or equal to 75% of estimated energy requirement, Unable to assess    2. Weight Loss-5% loss or greater, unable to assess  3. Fat Loss-Severe subcutaneous fat loss, Orbital  4. Muscle Loss-Severe muscle mass loss, Temples (temporalis muscle), Clavicles (pectoralis and deltoids)  5. Fluid Accumulation-(moderate fluid accumulation), Extremities  6.  Strength-Not measured    Nutrition Risk Level: High    Nutrient Needs:  · Estimated Daily Total Kcal: 1300 kcal based on 30 kcal/kg admission wt  · Estimated Daily Protein (g): 55-64 gm pro based on 1.3-1.5 gm/kg admission wt    Nutrition Diagnosis:   · Problem: Inadequate oral intake  · Etiology: related to Cognitive or neurological impairment, Impaired respiratory function-inability to consume food, Insufficient energy/nutrient consumption     Signs and symptoms:  as evidenced by Intake 0-25%, Weight loss, Severe loss of subcutaneous fat, Severe muscle loss    Objective Information:  · Nutrition-Focused Physical Findings: Edema: +2 RLE, +1 LLE. Cachectic appearance.    · Wound Type: Pressure Ulcer, Stage II  · Current Nutrition Therapies:  · Oral Diet Orders: General   · Oral Diet intake: 51-75%(Estimated)  · Oral Nutrition Supplement (ONS) Orders: Standard High Calorie Oral Supplement  · ONS intake: 26-50%(Estimated)  · Anthropometric Measures:  · Ht: 5' 2\" (157.5 cm)   · Current Body Wt: 97 lb 14.2 oz (44.4 kg)  · Admission Body Wt: 95 lb (43.1 kg)  · Usual Body Wt: 100 lb (45.4 kg)(from hx)  · % Weight Change:  ,  5%  · Ideal Body Wt: 110 lb (49.9 kg), % Ideal Body 86% based on admission wt  · BMI Classification: BMI <18.5 Underweight    Nutrition Interventions:   Continue current diet, Continue current ONS  Continued Inpatient Monitoring    Nutrition Evaluation:   · Evaluation: Progressing toward goals   · Goals: Adequate nutrition intake or provision    · Monitoring: Meal Intake, Supplement Intake, Mental Status/Confusion, Pertinent Labs, Diet Tolerance, Skin Integrity, Wound Healing, I&O, Monitor Hemodynamic Status    Aggie Bullock R.D., L.D.   Phone: [phone]

## 2019-05-21 NOTE — PROGRESS NOTES
ICU Progress Note (Non-Vent)  [facility]    Patient - Katlyn Farrell,  Age - 80 y.o.    - 10/15/1933      Room Number -    N -  [id number]   Acct # - [de-identified]  Date of Admission -  2019 12:32 AM    Events of Past 24 Hours   Still having episodic desaturation    Vitals    height is 5' 2\" (1.575 m) and weight is 97 lb 14.2 oz (44.4 kg). Her axillary temperature is 97.4 °F (36.3 °C). Her blood pressure is 141/45 (abnormal) and her pulse is 104. Her respiration is 32 (abnormal) and oxygen saturation is 91%.        Temperature Range: Temp: 97.4 °F (36.3 °C) Temp  Av.5 °F (36.4 °C)  Min: 96.6 °F (35.9 °C)  Max: 98.4 °F (36.9 °C)  BP Range:  Systolic (22RBE), TISHA , Min:87 , LKZ:880     Diastolic (15BUL), UQO:19, Min:25, Max:64    Pulse Range: Pulse  Av  Min: 73  Max: 113  Respiration Range: Resp  Av.7  Min: 16  Max: 38  Current Pulse Ox[de-identified]  SpO2: 91 %  24HR Pulse Ox Range:  SpO2  Av.4 %  Min: 87 %  Max: 100 %  Oxygen Amount and Delivery: O2 Flow Rate (L/min): 4 L/min    Wt Readings from Last 3 Encounters:   19 97 lb 14.2 oz (44.4 kg)   16 103 lb 9.9 oz (47 kg)   10/02/16 105 lb 13.1 oz (48 kg)     I/O       Intake/Output Summary (Last 24 hours) at 2019 1228  Last data filed at 2019 0505  Gross per 24 hour   Intake 1413.9 ml   Output 750 ml   Net 663.9 ml     DRAIN/TUBE OUTPUT       Invasive Lines   ICP PRESSURE RANGE  No data recorded  CVP PRESSURE RANGE  No data recorded      Medications      ipratropium-albuterol  1 ampule Inhalation Q4H    methylPREDNISolone  60 mg Intravenous Q6H    linezolid  600 mg Intravenous Q12H    nystatin  5 mL Oral 4x Daily    QUEtiapine  25 mg Oral Nightly    furosemide  20 mg Intravenous Daily    enoxaparin  30 mg Subcutaneous Daily    sodium chloride flush  10 mL Intravenous 2 times per day    spironolactone  50 mg Oral Daily    metoprolol tartrate  25 mg Oral BID    cefTRIAXone (ROCEPHIN) IV  1 g Intravenous Q24H    guaiFENesin  600 mg Oral BID     ALPRAZolam, diltiazem, sodium chloride flush, acetaminophen, acetylcysteine, perflutren lipid microspheres, potassium chloride **OR** potassium alternative oral replacement **OR** potassium chloride  IV Drips/Infusions   dexmedetomidine (PRECEDEX) IV infusion Stopped (05/21/19 0830)       Diet/Nutrition   DIET GENERAL;  Dietary Nutrition Supplements: Standard High Calorie Oral Supplement    Exam      Constitutional - Alert, arousable  General Appearance  well developed, well nourished  HEENT -normocephalic, atraumatic. PERRLA  Lungs - Chest expands equally, no wheezes, rales or rhonchi. Extremely poor air movement  Cardiovascular - Heart sounds are normal.  normal rate and rhythm regular, no murmur, gallop or rub. Abdomen - soft, nontender, nondistended, no masses or organomegaly  Neurologic - CN II-XII are grossly intact.  There are no focal motor deficits  Skin - no bruising or bleeding  Extremities - no cyanosis, clubbing or edema    Lab Results   CBC     Lab Results   Component Value Date    WBC 9.0 05/20/2019    RBC 2.63 05/20/2019    RBC 4.91 09/22/2011    HGB 7.7 05/20/2019    HCT 23.3 05/20/2019     05/20/2019     09/22/2011    MCV 88.7 05/20/2019    MCH 29.1 05/20/2019    MCHC 32.9 05/20/2019    RDW 17.1 05/20/2019    LYMPHOPCT 10 05/20/2019    MONOPCT 8 05/20/2019    BASOPCT 1 05/20/2019    MONOSABS 0.70 05/20/2019    LYMPHSABS 0.90 05/20/2019    EOSABS 0.00 05/20/2019    BASOSABS 0.00 05/20/2019    DIFFTYPE NOT REPORTED 05/20/2019       BMP   Lab Results   Component Value Date     05/21/2019    K 4.3 05/21/2019    CL 85 05/21/2019    CO2 43 05/21/2019    BUN 20 05/21/2019    CREATININE 0.56 05/21/2019    GLUCOSE 195 05/21/2019    GLUCOSE 114 09/22/2011       LFTS  Lab Results   Component Value Date    ALKPHOS 111 05/17/2019    ALT 10 05/17/2019 AST 18 05/17/2019    PROT 6.9 05/17/2019    BILITOT 0.41 05/17/2019    LABALBU 3.2 05/17/2019       ABG ABGs:   Lab Results   Component Value Date    PHART 7.568 05/18/2019    PO2ART 51.2 05/18/2019    EQN3YMH 57.3 05/18/2019       Lab Results   Component Value Date    MODE NOT REPORTED 05/18/2019         INR  No results for input(s): PROTIME, INR in the last 72 hours. APTT  No results for input(s): APTT in the last 72 hours. Lactic Acid  Lab Results   Component Value Date    LACTA 1.1 09/27/2016    LACTA 2.0 09/03/2016        BNP   No results for input(s): BNP in the last 72 hours. Cultures       Radiology     CXR      CT Scans    (See actual reports for details)      SYSTEMS ASSESSMENT    Acute on chronic hypoxic respiratory failure with chronic hypercapnia  Congestive heart failure with diastolic dysfunction  Probable exacerbation of COPD  MRSA pneumonia  Enterococcus UTI        Neuro   Has not needed to use Precedex drip    Respiratory   Wean oxygen as tolerated.  Keep O2 sat > 88%  Is better because she is on high-dose steroids  Wean Solu-Medrol  Continue aerosols    Cardiovascular   Continue IV Lasix  Follow pro BNP and chest x-ray tomorrow     Gastrointestinal   Encourage her to eat more    Renal   Creatinine actually improved with Lasix    Infectious Disease   Continue Rocephin and Zithromax and Zyvox    Hematology/Oncology   On DVT prophylaxis    Endocrine   Sugars elevated due to steroids    Social/Spiritual/DNR/Disposition/Other     [facility]  Critical Care Time   0 min    Electronically signed by Cary Martin MD on 5/21/2019 at 12:28 PM     Addendum: also growing Beta lactamase resistant strep so will d/c rocephin and switch to merrem

## 2019-05-21 NOTE — PLAN OF CARE
Problem: Falls - Risk of:  Goal: Will remain free from falls  Description  Will remain free from falls  Outcome: Ongoing  Note:   No falls noted this shift. Patient ambulates with x1 staff assistance without difficulty. Bed kept in low position. Safe environment maintained. Bedside table & call light in reach. Uses call light appropriately when needing assistance. Problem: Activity Intolerance:  Goal: Ability to tolerate increased activity will improve  Description  Ability to tolerate increased activity will improve  Outcome: Ongoing  Note:   Pt sits at side of bed without assistance; will continue to monitor activity tolerance. Problem: Gas Exchange - Impaired:  Goal: Levels of oxygenation will improve  Description  Levels of oxygenation will improve  Outcome: Ongoing  Note:   Patient resp rate 16-32 breath/min; pulse ox % on 4L oxygen via nasal canula; lung sounds clear with diminished bases and rhonchi at times; will continue to monitor oxygenation. Problem: Anxiety:  Goal: Level of anxiety will decrease  Description  Level of anxiety will decrease  Outcome: Ongoing  Note:   Pt anxiety treated medically, see MAR; pt able to sleep through night.

## 2019-05-21 NOTE — PROGRESS NOTES
Pt off bipap sitting at edge of bed visiting with family. spo2 98% on 5lpm nc, decreased to 4lpm nc. No distress noted.

## 2019-05-21 NOTE — PROGRESS NOTES
Physical Therapy  Facility/Department: [facility] ICU  Daily Treatment Note  NAME: Yue Salgado  : 10/15/1933  MRN: [id number]    Date of Service: 2019    Discharge Recommendations:  [address]       Patient Diagnosis(es): The encounter diagnosis was Hypoxia. has a past medical history of Arthritis, Atrial fibrillation ([facility] COPD (chronic obstructive pulmonary disease) ([facility] [facility] Diverticulitis, Hypertension, MI, old, and Thyroid disease. has a past surgical history that includes hernia repair; Abdomen surgery; Cardiac surgery; and Bunionectomy (Left). Restrictions  Restrictions/Precautions  Restrictions/Precautions: Isolation, Fall Risk, Up as Tolerated(MRSA, ESBL)  Required Braces or Orthoses?: No  Implants present? : (none)  Position Activity Restriction  Other position/activity restrictions: 4L O2/nc     Subjective   General  Chart Reviewed: Yes  Additional Pertinent Hx: This 80-year-old female, presents with recent onset of increasing shortness of breath. She has been coughing a lot more and bringing up copious amount of mucus. Her past medical history consists of COPD, essential hypertension, atrial fibrillation, and coronary artery disease. Response To Previous Treatment: Patient with no complaints from previous session.   Family / Caregiver Present: No  Subjective  Subjective: Pt pleasant and agreeable to participate in PT  General Comment  Comments: Per RN, pt OK for participation in PT  Pain Screening  Patient Currently in Pain: Denies  Vital Signs  Patient Currently in Pain: Denies    Orientation  Orientation  Overall Orientation Status: Within Functional Limits    Objective   Bed mobility  Supine to Sit: Minimal assistance  Sit to Supine: Unable to assess(Pt left up in chair with call light in reach, RN aware.)  Scooting: Stand by assistance(Pt requires extra time and effort but able to complete with verbal cues.)     Transfers  Sit to Stand: Minimal Assistance  Stand to sit: Minimal Assistance(Verbal and tactile cues for proper sequencing and safe hand placement.)  Bed to Chair: Contact guard assistance     Ambulation  Ambulation?: Yes  Ambulation 1  Surface: level tile  Device: Rolling Walker(perez walker)  Other Apparatus: O2(4L/nc)  Quality of Gait: Decreased foot clearance, step length, and heel strike bilaterally. Distance: 5' to bedside chair    Balance  Posture: Fair  Sitting - Static: Fair  Sitting - Dynamic: Fair;-  Standing - Static: Fair;+     Exercises  Comments: Pt instructed in BLE strengthening/ROM exercises x15 reps. Focus on eccentric control and technique. Assessment   Body structures, Functions, Activity limitations: Decreased functional mobility ; Decreased strength;Decreased safe awareness;Decreased balance;Decreased endurance  Treatment Diagnosis: Decreased functional mobility  Specific instructions for Next Treatment: Pt to discharge today to SNF  Prognosis: Good;Fair  Patient Education: pursed lip breathing, LE strengthening, gait and transfer training, importance of OOB activity  Barriers to Learning: none  REQUIRES PT FOLLOW UP: Yes  Activity Tolerance  Activity Tolerance: Patient Tolerated treatment well;Patient limited by endurance  Activity Tolerance: O2 desat to 77-80% during mobility, with pt requiring several minutes to return to baseline (91%). Education and demonstration in deep breathing/pursed lip breathing.      AM-PAC Score     AM-PAC Inpatient Mobility without Stair Climbing Raw Score : 15  AM-PAC Inpatient without Stair Climbing T-Scale Score : 43.03  Mobility Inpatient CMS 0-100% Score: 47.43  Mobility Inpatient without Stair CMS G-Code Modifier : CK      Goals  Short term goals  Time Frame for Short term goals: 5-7 treatments/week  Short term goal 1: Patient will demonstrate ability to move from supine to sit from Mod A of 1 to CGA inorder to improve independence at home   Short term goal 2: Patient will demonstrate ability to transfer from sit to supine MOD I inorder to improve independence at home  Short term goal 3: Patient will demonstrate increased ability to sit at the EOB for 10 minutes with supervision in order to improve trunk stability and core strength  Short term goal 4: Patient will demonstrate ability to transfer from sit to  order to improve functional mobility  Short term goal 5: Patient will demonstrate ability to walk 10 to 20ft with RW and mod assist of 1 with O2 at 3-4L via NC in order to improve functional mobility in the home   Short term goal 6: Patient will demonstrate ability to ascend and descend a 4 to 6 inch platform step with mod assist of 1 inorder to improve ability to negotiate stairs  Patient Goals   Patient goals : Patient's goal is to be able to cough up sputum and to be more mobile    Plan    Plan  Times per week: 5-7 treatments/ week  Specific instructions for Next Treatment: Pt to discharge today to   Current Treatment Recommendations: Strengthening, Balance Training, Functional Mobility Training, Transfer Training, Endurance Training, Gait Training, Home Exercise Program, Safety Education & Training, Patient/Caregiver Education & Training  Safety Devices  Type of devices:  All fall risk precautions in place, Call light within reach, Left in chair, Nurse notified(Nurse OK'd leaving pt off [employer] )     Therapy Time   Individual Concurrent Group Co-treatment   Time In 1115         Time Out [address] [place] [address]

## 2019-05-21 NOTE — PROGRESS NOTES
[facility]  Progress Note    5/20/2019 8:43 PM  Subjective: Interval History: Pt has BiPAP on and wants it off so she can drink water. No new problems reported, has been able to eat some. Pt has no c/o SOB. Diet: DIET GENERAL;  Dietary Nutrition Supplements: Standard High Calorie Oral Supplement    Medications:   Reviewed medications    Labs:   CBC:   Recent Labs     05/20/19  0415   WBC 9.0   HGB 7.7*        BMP:    Recent Labs     05/20/19  0415      K 3.8   CL 85*   CO2 45*   BUN 26*   CREATININE 0.61   GLUCOSE 107*     Hepatic: No results for input(s): AST, ALT, ALB, BILITOT, ALKPHOS in the last 72 hours. Troponin: No results for input(s): TROPONINI in the last 72 hours. BNP: No results for input(s): BNP in the last 72 hours. Lipids: No results for input(s): CHOL, HDL in the last 72 hours. Invalid input(s): LDLCALCU  INR: No results for input(s): INR in the last 72 hours.       Objective:   Vitals: /60   Pulse 90   Temp 98.4 °F (36.9 °C) (Oral)   Resp 29   Ht 5' 2\" (1.575 m)   Wt 95 lb 7.4 oz (43.3 kg)   SpO2 98%   BMI 17.46 kg/m²   General appearance: alert and cooperative with exam  Neck: no adenopathy, no carotid bruit, no JVD, supple, symmetrical, trachea midline and thyroid not enlarged, symmetric, no tenderness/mass/nodules  Lungs: Not clear to auscultation bilaterally, B/L basal crackles  Heart: Irregular rate and Irr rhythm, S1, S2 normal, no murmur, click, rub or gallop  Abdomen: soft, non-tender; bowel sounds normal; no masses,  no organomegaly  Extremities: extremities normal, atraumatic, no cyanosis or edema  Neurologic: Mental status: Alert, oriented, thought content appropriate    Assessment:     Patient Active Problem List    Diagnosis Date Noted    Severe malnutrition ([facility] [address] 05/19/2019    COPD (chronic obstructive pulmonary disease) ([facility] [facility] 05/17/2019    Hyperkalemia 10/01/2016    Anemia 09/29/2016    Hypokalemia 09/29/2016    Enterococcus UTI

## 2019-05-21 NOTE — PROGRESS NOTES
Progress Note    5/21/2019 8:30 AM  Subjective: Interval History: Stable;breathing appears to be at baseline    Diet: DIET GENERAL;  Dietary Nutrition Supplements: Standard High Calorie Oral Supplement    Medications:   Reviewed medications    Labs:   CBC:   Recent Labs     05/20/19  0415   WBC 9.0   HGB 7.7*        BMP:    Recent Labs     05/21/19  0457   *   K 4.3   CL 85*   CO2 43*   BUN 20   CREATININE 0.56   GLUCOSE 195*     Hepatic: No results for input(s): AST, ALT, ALB, BILITOT, ALKPHOS in the last 72 hours. Troponin: No results for input(s): TROPONINI in the last 72 hours. BNP: No results for input(s): BNP in the last 72 hours. Lipids: No results for input(s): CHOL, HDL in the last 72 hours. Invalid input(s): LDLCALCU  INR: No results for input(s): INR in the last 72 hours.       Objective:   Vitals: BP (!) 116/39   Pulse 78   Temp 96.6 °F (35.9 °C) (Axillary)   Resp 21   Ht 5' 2\" (1.575 m)   Wt 97 lb 14.2 oz (44.4 kg)   SpO2 96%   BMI 17.90 kg/m²   General appearance: alert and cooperative with exam  Neck: no adenopathy, no carotid bruit, no JVD, supple, symmetrical, trachea midline and thyroid not enlarged, symmetric, no tenderness/mass/nodules  Lungs: clear to auscultation bilaterally but diminished throughout  Heart: regular rate and irregular rhythm, S1, S2 normal, no murmur, click, rub or gallop  Abdomen: soft, non-tender; bowel sounds normal; no masses,  no organomegaly  Extremities: extremities normal, atraumatic, no cyanosis or edema  Neurologic: Mental status: Alert, oriented, thought content appropriate    Assessment:     Patient Active Problem List    Diagnosis Date Noted    Severe malnutrition ([facility] 05/19/2019    COPD (chronic obstructive pulmonary disease) ([facility] [facility] 05/17/2019    Hyperkalemia 10/01/2016    Anemia 09/29/2016    Hypokalemia 09/29/2016    Enterococcus UTI 09/29/2016    Urinary tract infection without hematuria 09/29/2016    Leukocytosis     Hypophosphatemia 09/09/2016    Non-sustained ventricular tachycardia ([facility] 09/09/2016    Acute on chronic diastolic congestive heart failure ([facility] [address] [facility] 09/08/2016    E-coli UTI 09/07/2016    Acute cystitis     Septic shock due to undetermined organism ([facility] [address] 09/06/2016    Protein-calorie malnutrition, severe ([facility] [facility] 09/06/2016    Decubitus ulcer, stage 1 09/06/2016    Sepsis due to pneumonia ([facility] [facility] 09/04/2016    Hyponatremia 09/04/2016    Tachycardia 09/04/2016    Altered mental status 09/04/2016    Hypertension 09/04/2016    Chronic obstructive pulmonary disease with acute lower respiratory infection ([facility] [facility] 09/04/2016    A-fib ([facility] [facility] 09/04/2016    CAD (coronary artery disease) 09/04/2016        Plan:   1. Evaluation for home O2  2.  Anticipate DC tomorrow to [facility]    Electronically signed by Elio Peabody, MD on 5/21/2019 at 8:30 AM

## 2019-05-21 NOTE — PROGRESS NOTES
×5  Hypertension  Peripheral vascular disease, femoral artery bruit, stents  Anemia  Severe COPD secondary to smoking  Status post bowel surgery history of septic shock in 2016  History of recurrent UTI    Vitals: BP (!) 141/45   Pulse 104   Temp 98.5 °F (36.9 °C) (Axillary)   Resp 26   Ht 5' 2\" (1.575 m)   Wt 97 lb 14.2 oz (44.4 kg)   SpO2 96%   BMI 17.90 kg/m²     Sodium 134, potassium 4.3, chloride 84, CO2 43, BUN 20, creatinine 0.56, glucose 155 Pro BNP 27,984    Hemoglobin 7.7, WBC 9.0, platelets 352    Patient seen and examined with the patient nurse  Elderly very frail looking female at present resting at 45° angle  Denies any chest pain  Mild lower abdominal pain  She is feeling better     Medications:   Scheduled Meds:   methylPREDNISolone  40 mg Intravenous Q8H    meropenem  1 g Intravenous Q8H    ipratropium-albuterol  1 ampule Inhalation Q4H    linezolid  600 mg Intravenous Q12H    nystatin  5 mL Oral 4x Daily    QUEtiapine  25 mg Oral Nightly    furosemide  20 mg Intravenous Daily    sodium chloride flush  10 mL Intravenous 2 times per day    spironolactone  50 mg Oral Daily    metoprolol tartrate  25 mg Oral BID    guaiFENesin  600 mg Oral BID     Continuous Infusions:  CBC:   Recent Labs     05/20/19  0415   WBC 9.0   HGB 7.7*        BMP:    Recent Labs     05/19/19  0421 05/20/19  0415 05/21/19  0457    139 134*   K 4.7 3.8 4.3   CL 86* 85* 85*   CO2 44* 45* 43*   BUN 20 26* 20   CREATININE 0.75 0.61 0.56   GLUCOSE 133* 107* 195*     Hepatic: No results for input(s): AST, ALT, ALB, BILITOT, ALKPHOS in the last 72 hours. Troponin: No results for input(s): TROPONINI in the last 72 hours. BNP: No results for input(s): BNP in the last 72 hours. Lipids: No results for input(s): CHOL, HDL in the last 72 hours. Invalid input(s): LDLCALCU  INR: No results for input(s): INR in the last 72 hours.     Objective:   Vitals: BP (!) 141/45   Pulse 104   Temp 98.5 °F (36.9 °C) (Axillary)   Resp 26   Ht 5' 2\" (1.575 m)   Wt 97 lb 14.2 oz (44.4 kg)   SpO2 96%   BMI 17.90 kg/m²   General appearance: alert and cooperative with exam  HEENT: Head: Normal, normocephalic, atraumatic. Neck: no adenopathy, no carotid bruit, no JVD, supple, symmetrical, trachea midline and thyroid not enlarged, symmetric, no tenderness/mass/nodules  Lungs: diminished breath sounds bibasilar  Heart: irregularly irregular rhythm  Abdomen: Abdomen is soft, mild tenderness  Extremities: extremities normal, atraumatic, no cyanosis or edema  Neurologic: Mental status: Alert, oriented, thought content appropriate    EKG: atrial fibrillation, rate 90 /sinus rhythm with multifocal PAC , Atrial runs, unchanged from previous tracings. ECHO: reviewed. Ejection fraction: 55%  Stress Test: not obtained. Cardiac Angiography: not obtained.         Assessment / Acute Cardiac Problems:   Patient admitted on 5/17/2019 with respiratory failure, severe hypercapnia, severe hypoxia, severe alkalosis, hallucination and altered mental status  ABG on admission pH 7.54, PCO2 71, PCO2 54, oxygen saturation 88%, bicarb 60.7 and base excess 35.7  Diastolic congestive heart failure with bilateral pleural effusion, proBNP 29,430-1 5/17/2019  A. fib with RVR on admission at present heart rate stable, episode of A. fib with slow ventricular response heart rate 40-50 on 5/19/2019     Congestive heart failure diastolic, ejection fraction 55%  Coronary artery disease, stents ×5  Hypertension  Peripheral vascular disease, femoral artery bruit, stents  Anemia  Severe COPD secondary to smoking  Status post bowel surgery history of septic shock in 2016  History of recurrent UTI    5/21/2019 hemodynamically stable, no further episode of bradycardia    Patient Active Problem List:     Sepsis due to pneumonia ([address]     Hyponatremia     Tachycardia     Altered mental status     Hypertension     Chronic obstructive pulmonary disease with acute lower respiratory infection ([facility]     A-fib ([address]     CAD (coronary artery disease)     Septic shock due to undetermined organism ([facility] [address]     Protein-calorie malnutrition, severe (HCC)     Decubitus ulcer, stage 1     E-coli UTI     Acute cystitis     Acute on chronic diastolic congestive heart failure (HCC)     Hypophosphatemia     Non-sustained ventricular tachycardia ([facility])     Leukocytosis     Anemia     Hypokalemia     Enterococcus UTI     Urinary tract infection without hematuria     Hyperkalemia     COPD (chronic obstructive pulmonary disease) ([facility] [address]     Severe malnutrition ([facility] [address]      Plan of Treatment:   Continue with the metoprolol 25 mg twice a day and Aldactone 50 mg a day    Electronically signed by Nereyda Giron MD on 5/21/2019 at 3:23 PM

## 2019-05-21 NOTE — PROGRESS NOTES
Home Oxygen Evaluation    Home Oxygen Evaluation completed. Patient is on 4 liters per minute via nasal cannula  Resting SpO2 = 90%  Resting SpO2 on room air = 85%    SpO2 on room air with exercise = na%  SpO2 on oxygen as above with exercise = na%    Nocturnal Oximetry with patient on room air is recommended is SpO2 is between 89% and 95% (requires additional order).     Nasrin Goodson  8:40 AM

## 2019-05-21 NOTE — CARE COORDINATION
DISCHARGE PLANNING NOTE:    Plan is for this patient to go to [facility]. Patient has been accepted there and no precert is required. Already has qualifying 3 midnight stay and could discharge there any time. Remains on IV rocephin, IV zyvox, IV lasix 20 daily and steroids 40Q8. Will continue to follow along with LSW for any further discharge needs.      Electronically signed by Tuyet Ruiz RN on 5/21/2019 at 1:49 PM

## 2019-05-21 NOTE — PLAN OF CARE
Nutrition Problem: Inadequate oral intake  Intervention: Food and/or Nutrient Delivery: Continue current diet, Continue current ONS  Nutritional Goals: Adequate nutrition intake or provision

## 2019-05-22 ENCOUNTER — APPOINTMENT (OUTPATIENT)
Dept: GENERAL RADIOLOGY | Age: 84
DRG: 177 | End: 2019-05-22
Payer: MEDICARE

## 2019-05-22 LAB
ANION GAP SERPL CALCULATED.3IONS-SCNC: 4 MMOL/L (ref 9–17)
BUN BLDV-MCNC: 26 MG/DL (ref 8–23)
BUN/CREAT BLD: ABNORMAL (ref 9–20)
CALCIUM SERPL-MCNC: 8.9 MG/DL (ref 8.6–10.4)
CHLORIDE BLD-SCNC: 86 MMOL/L (ref 98–107)
CO2: 45 MMOL/L (ref 20–31)
CREAT SERPL-MCNC: 0.61 MG/DL (ref 0.5–0.9)
GFR AFRICAN AMERICAN: >60 ML/MIN
GFR NON-AFRICAN AMERICAN: >60 ML/MIN
GFR SERPL CREATININE-BSD FRML MDRD: ABNORMAL ML/MIN/{1.73_M2}
GFR SERPL CREATININE-BSD FRML MDRD: ABNORMAL ML/MIN/{1.73_M2}
GLUCOSE BLD-MCNC: 184 MG/DL (ref 70–99)
POTASSIUM SERPL-SCNC: 4.5 MMOL/L (ref 3.7–5.3)
SODIUM BLD-SCNC: 135 MMOL/L (ref 135–144)

## 2019-05-22 PROCEDURE — 6370000000 HC RX 637 (ALT 250 FOR IP): Performed by: INTERNAL MEDICINE

## 2019-05-22 PROCEDURE — 6360000002 HC RX W HCPCS: Performed by: INTERNAL MEDICINE

## 2019-05-22 PROCEDURE — 2580000003 HC RX 258: Performed by: INTERNAL MEDICINE

## 2019-05-22 PROCEDURE — 97535 SELF CARE MNGMENT TRAINING: CPT

## 2019-05-22 PROCEDURE — 2060000000 HC ICU INTERMEDIATE R&B

## 2019-05-22 PROCEDURE — 6370000000 HC RX 637 (ALT 250 FOR IP): Performed by: FAMILY MEDICINE

## 2019-05-22 PROCEDURE — 94761 N-INVAS EAR/PLS OXIMETRY MLT: CPT

## 2019-05-22 PROCEDURE — 97530 THERAPEUTIC ACTIVITIES: CPT

## 2019-05-22 PROCEDURE — 71045 X-RAY EXAM CHEST 1 VIEW: CPT

## 2019-05-22 PROCEDURE — 2580000003 HC RX 258: Performed by: FAMILY MEDICINE

## 2019-05-22 PROCEDURE — 80048 BASIC METABOLIC PNL TOTAL CA: CPT

## 2019-05-22 PROCEDURE — 36415 COLL VENOUS BLD VENIPUNCTURE: CPT

## 2019-05-22 PROCEDURE — 2700000000 HC OXYGEN THERAPY PER DAY

## 2019-05-22 PROCEDURE — 94640 AIRWAY INHALATION TREATMENT: CPT

## 2019-05-22 RX ADMIN — METOPROLOL TARTRATE 25 MG: 25 TABLET ORAL at 08:59

## 2019-05-22 RX ADMIN — ACETAMINOPHEN 650 MG: 325 TABLET, FILM COATED ORAL at 18:42

## 2019-05-22 RX ADMIN — IPRATROPIUM BROMIDE AND ALBUTEROL SULFATE 1 AMPULE: .5; 3 SOLUTION RESPIRATORY (INHALATION) at 16:24

## 2019-05-22 RX ADMIN — LINEZOLID 600 MG: 600 INJECTION, SOLUTION INTRAVENOUS at 23:24

## 2019-05-22 RX ADMIN — METHYLPREDNISOLONE SODIUM SUCCINATE 40 MG: 40 INJECTION, POWDER, FOR SOLUTION INTRAMUSCULAR; INTRAVENOUS at 23:24

## 2019-05-22 RX ADMIN — GUAIFENESIN 600 MG: 600 TABLET, EXTENDED RELEASE ORAL at 21:00

## 2019-05-22 RX ADMIN — GUAIFENESIN 600 MG: 600 TABLET, EXTENDED RELEASE ORAL at 08:59

## 2019-05-22 RX ADMIN — METOPROLOL TARTRATE 25 MG: 25 TABLET ORAL at 21:00

## 2019-05-22 RX ADMIN — SPIRONOLACTONE 50 MG: 25 TABLET, FILM COATED ORAL at 08:59

## 2019-05-22 RX ADMIN — NYSTATIN 500000 UNITS: 500000 SUSPENSION ORAL at 18:34

## 2019-05-22 RX ADMIN — IPRATROPIUM BROMIDE AND ALBUTEROL SULFATE 1 AMPULE: .5; 3 SOLUTION RESPIRATORY (INHALATION) at 12:01

## 2019-05-22 RX ADMIN — NYSTATIN 500000 UNITS: 500000 SUSPENSION ORAL at 16:00

## 2019-05-22 RX ADMIN — LINEZOLID 600 MG: 600 INJECTION, SOLUTION INTRAVENOUS at 11:03

## 2019-05-22 RX ADMIN — MEROPENEM 1 G: 1 INJECTION, POWDER, FOR SOLUTION INTRAVENOUS at 15:59

## 2019-05-22 RX ADMIN — METHYLPREDNISOLONE SODIUM SUCCINATE 40 MG: 40 INJECTION, POWDER, FOR SOLUTION INTRAMUSCULAR; INTRAVENOUS at 02:03

## 2019-05-22 RX ADMIN — METHYLPREDNISOLONE SODIUM SUCCINATE 40 MG: 40 INJECTION, POWDER, FOR SOLUTION INTRAMUSCULAR; INTRAVENOUS at 08:59

## 2019-05-22 RX ADMIN — NYSTATIN 500000 UNITS: 500000 SUSPENSION ORAL at 21:00

## 2019-05-22 RX ADMIN — LINEZOLID 600 MG: 600 INJECTION, SOLUTION INTRAVENOUS at 02:03

## 2019-05-22 RX ADMIN — IPRATROPIUM BROMIDE AND ALBUTEROL SULFATE 1 AMPULE: .5; 3 SOLUTION RESPIRATORY (INHALATION) at 21:31

## 2019-05-22 RX ADMIN — Medication 10 ML: at 10:00

## 2019-05-22 RX ADMIN — QUETIAPINE FUMARATE 25 MG: 50 TABLET ORAL at 21:00

## 2019-05-22 RX ADMIN — METHYLPREDNISOLONE SODIUM SUCCINATE 40 MG: 40 INJECTION, POWDER, FOR SOLUTION INTRAMUSCULAR; INTRAVENOUS at 15:59

## 2019-05-22 RX ADMIN — IPRATROPIUM BROMIDE AND ALBUTEROL SULFATE 1 AMPULE: .5; 3 SOLUTION RESPIRATORY (INHALATION) at 07:39

## 2019-05-22 RX ADMIN — FUROSEMIDE 20 MG: 10 INJECTION, SOLUTION INTRAMUSCULAR; INTRAVENOUS at 08:59

## 2019-05-22 RX ADMIN — NYSTATIN 500000 UNITS: 500000 SUSPENSION ORAL at 11:03

## 2019-05-22 RX ADMIN — MEROPENEM 1 G: 1 INJECTION, POWDER, FOR SOLUTION INTRAVENOUS at 06:44

## 2019-05-22 RX ADMIN — MEROPENEM 1 G: 1 INJECTION, POWDER, FOR SOLUTION INTRAVENOUS at 21:00

## 2019-05-22 RX ADMIN — IPRATROPIUM BROMIDE AND ALBUTEROL SULFATE 1 AMPULE: .5; 3 SOLUTION RESPIRATORY (INHALATION) at 00:14

## 2019-05-22 ASSESSMENT — PAIN SCALES - GENERAL
PAINLEVEL_OUTOF10: 5
PAINLEVEL_OUTOF10: 6

## 2019-05-22 NOTE — PROGRESS NOTES
Stand by assistance  UE Bathing: Stand by assistance  LE Bathing: Stand by assistance;Contact guard assistance  UE Dressing: Stand by assistance  LE Dressing: Stand by assistance;Contact guard assistance  Toileting: Dependent/Total(External female catheter in place)  Additional Comments: Pt requires A c ADLs at this time 2* external female catheter, IV/medical lines. Pt changes footies while upright in bed bending LE to increase ease. Remainder per clinical reason; pt declined futher participation. Pt like to require A/Supervision with standing tasks until pt begins intitating strategies to manage dizziness. Transfers  Sit to stand: Contact guard assistance  Stand to sit: Contact guard assistance  Transfer Comments: VC for hand placement c poor return noted; initial dizziness c VC for fxl use of focal points, G return noted                  Vision  Patient Visual Report: No visual complaint reported. Assessment  Performance deficits / Impairments: Decreased functional mobility ; Decreased ADL status; Decreased strength;Decreased endurance;Decreased balance  Assessment: mild dizziness upon standing, G recovery c use of focal points; poor RW safety  Prognosis: Good  Discharge Recommendations: Subacute/Skilled Nursing Facility;Continue to assess pending progress  Activity Tolerance: Patient limited by fatigue  Safety Devices in place: Yes  Type of devices: Patient at risk for falls; Left in bed;Call light within reach; Bed alarm in place  Equipment Recommendations  Equipment Needed: (TBD)          Patient Education:  Patient Education: OT POC, activity promotion, safety, bed mobs  Learner:patient  Method: demonstration and explanation       Outcome: needs reinforcement     Plan  Safety Devices  Safety Devices in place: Yes  Type of devices: Patient at risk for falls, Left in bed, Call light within reach, Bed alarm in place  Plan  Times per week: 5-7  Times per day: Daily  Current Treatment Recommendations: Strengthening, Balance Training, Functional Mobility Training, Endurance Training, Safety Education & Training, Patient/Caregiver Education & Training, Equipment Evaluation, Education, & procurement, Self-Care / ADL      Goals  Short term goals  Time Frame for Short term goals: By Discharge  Short term goal 1: Pt will complete bed mobility with supervision and tolerate sitting EOB for 5-10 minutes unsupported while completing a functional task. Short term goal 2: Pt will actively participate in self-care routine and complete UB with set-up A only and LB with Min A using AE if appropriate. Short term goal 3: Pt will complete toilet transfer and toileting with Min A and Good safety using appropriate DME. Short term goal 4: Pt will V/D good understanding of EC/WS techniques that she can utilize during daily routines. Short term goal 5: Pt will actively participate in 15-20 minutes of therapeutic exercise/activity to promote increased independence and safety with self-care and mobility.     OT Individual Minutes  Time In: 1440  Time Out: 2639  Minutes: 24      Electronically signed by TATUM Ferro on 5/22/19 at 3:46 PM

## 2019-05-22 NOTE — CARE COORDINATION
DISCHARGE PLANNING NOTE:    Plan remains for this patient to go to [facility]. Patient is accepted there and has 3 midnight qualifying stay and could go there at any time - No precert required. Remains on IV merrem, IV zyvox, steroids 40Q8 and IV lasix 20 daily. Will continue to follow along with LSW for any further discharge plans.      Electronically signed by Caridad Burris RN on 5/22/2019 at 11:59 AM

## 2019-05-22 NOTE — PLAN OF CARE
Problem: Falls - Risk of:  Goal: Will remain free from falls  Description  Will remain free from falls  Outcome: Ongoing  Note:   The patient remained free from falls this shift, call light within reach, bed in locked and lowest position. Side rails up x2. Continue to monitor closely. Problem: Gas Exchange - Impaired:  Goal: Levels of oxygenation will improve  Description  Levels of oxygenation will improve  Outcome: Ongoing  Note:   Oxygenation within normal limits. Will continue to monitor. Problem: Anxiety:  Goal: Level of anxiety will decrease  Description  Level of anxiety will decrease  Outcome: Ongoing  Note:   Patient receiving medication as ordered.

## 2019-05-22 NOTE — PROGRESS NOTES
Pulmonary Progress Note  [facility]ists      Patient - Jenny Shipley,  Age - 80 y.o.    - 10/15/1933      Room Number - 2121/2121-01   N -  [id number]   [facility]  Date of Admission -  2019 12:32 AM        Consulting Crystal Rey MD  Primary Care Physician - Elvis Huntley MD     SUBJECTIVE   Says she is about the same terms of her breathing    OBJECTIVE   VITALS    height is 5' 2\" (1.575 m) and weight is 97 lb 14.2 oz (44.4 kg). Her temperature is 97.3 °F (36.3 °C). Her blood pressure is 115/48 (abnormal) and her pulse is 77. Her respiration is 16 and oxygen saturation is 98%. Body mass index is 17.9 kg/m². Temperature Range: Temp: 97.3 °F (36.3 °C) Temp  Av.1 °F (36.7 °C)  Min: 97.3 °F (36.3 °C)  Max: 98.5 °F (36.9 °C)  BP Range:  Systolic (15XHG), MCI:051 , Min:102 , VZD:124     Diastolic (94TOJ), EIY:36, Min:47, Max:81    Pulse Range: Pulse  Av.5  Min: 77  Max: 103  Respiration Range: Resp  Av.1  Min: 16  Max: 26  Current Pulse Ox[de-identified]  SpO2: 98 %  24HR Pulse Ox Range:  SpO2  Av %  Min: 92 %  Max: 100 %  Oxygen Amount and Delivery: O2 Flow Rate (L/min): 4 L/min    Wt Readings from Last 3 Encounters:   19 97 lb 14.2 oz (44.4 kg)   16 103 lb 9.9 oz (47 kg)   10/02/16 105 lb 13.1 oz (48 kg)       I/O (24 Hours)    Intake/Output Summary (Last 24 hours) at 2019 1207  Last data filed at 2019 0640  Gross per 24 hour   Intake --   Output 300 ml   Net -300 ml       EXAM     General Appearance  Awake, alert, oriented, in no acute distress  HEENT - normocephalic, atraumatic.  []  Mallampati  [] Crowded airway   [] Macroglossia  []  Retrognathia  [] Micrognathia  []  Normal tongue size []  Normal Bite  [] Mary sign positive    Neck - Supple,  trachea midline   Lungs - diminished wheezes, improved compared with yesterday  Cardiovascular - Heart sounds are normal. Regular rate and rhythm   Abdomen - Soft, nontender, nondistended, no masses or organomegaly  Neurologic - There are no focal motor or sensory deficits  Skin - No bruising or bleeding  Extremities - No clubbing, cyanosis, edema    MEDS      methylPREDNISolone  40 mg Intravenous Q8H    meropenem  1 g Intravenous Q8H    ipratropium-albuterol  1 ampule Inhalation Q4H    linezolid  600 mg Intravenous Q12H    nystatin  5 mL Oral 4x Daily    QUEtiapine  25 mg Oral Nightly    furosemide  20 mg Intravenous Daily    sodium chloride flush  10 mL Intravenous 2 times per day    spironolactone  50 mg Oral Daily    metoprolol tartrate  25 mg Oral BID    guaiFENesin  600 mg Oral BID       ALPRAZolam, diltiazem, sodium chloride flush, acetaminophen, acetylcysteine, perflutren lipid microspheres, potassium chloride **OR** potassium alternative oral replacement **OR** potassium chloride    LABS   CBC   Recent Labs     05/20/19  0415   WBC 9.0   HGB 7.7*   HCT 23.3*   MCV 88.7        BMP:   Lab Results   Component Value Date     05/22/2019    K 4.5 05/22/2019    CL 86 05/22/2019    CO2 45 05/22/2019    BUN 26 05/22/2019    LABALBU 3.2 05/17/2019    CREATININE 0.61 05/22/2019    CALCIUM 8.9 05/22/2019    GFRAA >60 05/22/2019    LABGLOM >60 05/22/2019     ABGs:  Lab Results   Component Value Date    PHART 7.568 05/18/2019    PO2ART 51.2 05/18/2019    KHW2WBT 57.3 05/18/2019      Lab Results   Component Value Date    MODE NOT REPORTED 05/18/2019     Ionized Calcium:  No results found for: IONCA  Magnesium:    Lab Results   Component Value Date    MG 2.4 05/17/2019     Phosphorus:    Lab Results   Component Value Date    PHOS 2.6 09/11/2016        LIVER PROFILE No results for input(s): AST, ALT, LIPASE, BILIDIR, BILITOT, ALKPHOS in the last 72 hours. Invalid input(s): AMYLASE,  ALB  INR No results for input(s): INR in the last 72 hours.   PTT   Lab Results   Component Value Date    APTT 31.3 05/17/2019

## 2019-05-22 NOTE — PROGRESS NOTES
[facility]  Progress Note    5/22/2019 1:19 PM  Subjective: Interval History: Pt has no CP/SOB/N/V/D, eating better, OK with SNF on d/c. Diet: DIET GENERAL;  Dietary Nutrition Supplements: Standard High Calorie Oral Supplement    Medications:   Reviewed medications    Labs:   CBC:   Recent Labs     05/20/19  0415   WBC 9.0   HGB 7.7*        BMP:    Recent Labs     05/22/19  0525      K 4.5   CL 86*   CO2 45*   BUN 26*   CREATININE 0.61   GLUCOSE 184*     Hepatic: No results for input(s): AST, ALT, ALB, BILITOT, ALKPHOS in the last 72 hours. Troponin: No results for input(s): TROPONINI in the last 72 hours. BNP: No results for input(s): BNP in the last 72 hours. Lipids: No results for input(s): CHOL, HDL in the last 72 hours. Invalid input(s): LDLCALCU  INR: No results for input(s): INR in the last 72 hours.       Objective:   Vitals: BP (!) 124/49   Pulse 72   Temp 97.5 °F (36.4 °C) (Axillary)   Resp 18   Ht 5' 2\" (1.575 m)   Wt 97 lb 14.2 oz (44.4 kg)   SpO2 100%   BMI 17.90 kg/m²   General appearance: alert and cooperative with exam  Neck: no adenopathy, no carotid bruit, no JVD, supple, symmetrical, trachea midline and thyroid not enlarged, symmetric, no tenderness/mass/nodules  Lungs: clear to auscultation bilaterally,decreased BS b/l bases  Heart: regular rate and rhythm, S1, S2 normal, no murmur, click, rub or gallop  Abdomen: soft, non-tender; bowel sounds normal; no masses,  no organomegaly  Extremities: extremities normal, atraumatic, no cyanosis or edema  Neurologic: Mental status: Alert, oriented, thought content appropriate    Assessment:     Patient Active Problem List    Diagnosis Date Noted    Severe malnutrition ([facility] 05/19/2019    COPD (chronic obstructive pulmonary disease) ([facility] 05/17/2019    Hyperkalemia 10/01/2016    Anemia 09/29/2016    Hypokalemia 09/29/2016    Enterococcus UTI 09/29/2016    Urinary tract infection without hematuria 09/29/2016    Leukocytosis     Hypophosphatemia 09/09/2016    Non-sustained ventricular tachycardia ([facility] 09/09/2016    Acute on chronic diastolic congestive heart failure ([facility] 09/08/2016    E-coli UTI 09/07/2016    Acute cystitis     Septic shock due to undetermined organism ([facility] 09/06/2016    Protein-calorie malnutrition, severe ([facility] 09/06/2016    Decubitus ulcer, stage 1 09/06/2016    Sepsis due to pneumonia ([facility] 09/04/2016    Hyponatremia 09/04/2016    Tachycardia 09/04/2016    Altered mental status 09/04/2016    Hypertension 09/04/2016    Chronic obstructive pulmonary disease with acute lower respiratory infection ([facility] 09/04/2016    A-fib ([facility] 09/04/2016    CAD (coronary artery disease) 09/04/2016        Plan:   1. Cont Meropenem and Zyvox. SNF on d/c.     Electronically signed by Tre Villalobos MD on 5/22/2019 at 1:19 PM

## 2019-05-22 NOTE — CARE COORDINATION
BPCI-A Medical Bundle Patient. Working DR-pneumonia   Admitting Location: [facility]    90-day post-acute tracking and outreach with qualifying DRG from date of discharge.

## 2019-05-23 ENCOUNTER — APPOINTMENT (OUTPATIENT)
Dept: CT IMAGING | Age: 84
DRG: 177 | End: 2019-05-23
Payer: MEDICARE

## 2019-05-23 LAB
ANION GAP SERPL CALCULATED.3IONS-SCNC: 7 MMOL/L (ref 9–17)
BUN BLDV-MCNC: 28 MG/DL (ref 8–23)
BUN/CREAT BLD: ABNORMAL (ref 9–20)
CALCIUM SERPL-MCNC: 8.6 MG/DL (ref 8.6–10.4)
CHLORIDE BLD-SCNC: 85 MMOL/L (ref 98–107)
CO2: 43 MMOL/L (ref 20–31)
CREAT SERPL-MCNC: 0.64 MG/DL (ref 0.5–0.9)
CULTURE: ABNORMAL
GFR AFRICAN AMERICAN: >60 ML/MIN
GFR NON-AFRICAN AMERICAN: >60 ML/MIN
GFR SERPL CREATININE-BSD FRML MDRD: ABNORMAL ML/MIN/{1.73_M2}
GFR SERPL CREATININE-BSD FRML MDRD: ABNORMAL ML/MIN/{1.73_M2}
GLUCOSE BLD-MCNC: 185 MG/DL (ref 70–99)
Lab: ABNORMAL
POTASSIUM SERPL-SCNC: 4.7 MMOL/L (ref 3.7–5.3)
SODIUM BLD-SCNC: 135 MMOL/L (ref 135–144)
SPECIMEN DESCRIPTION: ABNORMAL

## 2019-05-23 PROCEDURE — 94761 N-INVAS EAR/PLS OXIMETRY MLT: CPT

## 2019-05-23 PROCEDURE — 2580000003 HC RX 258: Performed by: INTERNAL MEDICINE

## 2019-05-23 PROCEDURE — 6360000004 HC RX CONTRAST MEDICATION: Performed by: INTERNAL MEDICINE

## 2019-05-23 PROCEDURE — 6370000000 HC RX 637 (ALT 250 FOR IP): Performed by: INTERNAL MEDICINE

## 2019-05-23 PROCEDURE — 71260 CT THORAX DX C+: CPT

## 2019-05-23 PROCEDURE — 94640 AIRWAY INHALATION TREATMENT: CPT

## 2019-05-23 PROCEDURE — 36415 COLL VENOUS BLD VENIPUNCTURE: CPT

## 2019-05-23 PROCEDURE — 6370000000 HC RX 637 (ALT 250 FOR IP): Performed by: FAMILY MEDICINE

## 2019-05-23 PROCEDURE — 6360000002 HC RX W HCPCS: Performed by: INTERNAL MEDICINE

## 2019-05-23 PROCEDURE — 2700000000 HC OXYGEN THERAPY PER DAY

## 2019-05-23 PROCEDURE — 80048 BASIC METABOLIC PNL TOTAL CA: CPT

## 2019-05-23 PROCEDURE — 2060000000 HC ICU INTERMEDIATE R&B

## 2019-05-23 PROCEDURE — 2580000003 HC RX 258: Performed by: FAMILY MEDICINE

## 2019-05-23 RX ORDER — 0.9 % SODIUM CHLORIDE 0.9 %
80 INTRAVENOUS SOLUTION INTRAVENOUS ONCE
Status: COMPLETED | OUTPATIENT
Start: 2019-05-23 | End: 2019-05-23

## 2019-05-23 RX ORDER — CIPROFLOXACIN 500 MG/1
500 TABLET, FILM COATED ORAL 2 TIMES DAILY
Qty: 10 TABLET | Refills: 0
Start: 2019-05-23 | End: 2019-05-28

## 2019-05-23 RX ORDER — SODIUM CHLORIDE 0.9 % (FLUSH) 0.9 %
10 SYRINGE (ML) INJECTION PRN
Status: DISCONTINUED | OUTPATIENT
Start: 2019-05-23 | End: 2019-05-24 | Stop reason: SDUPTHER

## 2019-05-23 RX ORDER — METHYLPREDNISOLONE SODIUM SUCCINATE 40 MG/ML
40 INJECTION, POWDER, LYOPHILIZED, FOR SOLUTION INTRAMUSCULAR; INTRAVENOUS ONCE
Status: COMPLETED | OUTPATIENT
Start: 2019-05-23 | End: 2019-05-23

## 2019-05-23 RX ORDER — AMOXICILLIN 500 MG/1
500 CAPSULE ORAL 3 TIMES DAILY
Qty: 30 CAPSULE | Refills: 0 | Status: SHIPPED | OUTPATIENT
Start: 2019-05-23 | End: 2019-05-23 | Stop reason: HOSPADM

## 2019-05-23 RX ORDER — SPIRONOLACTONE 50 MG/1
50 TABLET, FILM COATED ORAL DAILY
Qty: 30 TABLET | Refills: 3 | Status: SHIPPED | OUTPATIENT
Start: 2019-05-23

## 2019-05-23 RX ORDER — DOXYCYCLINE HYCLATE 100 MG
100 TABLET ORAL 2 TIMES DAILY
Qty: 10 TABLET | Refills: 0
Start: 2019-05-23 | End: 2019-05-28

## 2019-05-23 RX ORDER — ALPRAZOLAM 0.25 MG/1
0.25 TABLET ORAL 3 TIMES DAILY PRN
Qty: 30 TABLET | Refills: 1 | Status: SHIPPED | OUTPATIENT
Start: 2019-05-23 | End: 2019-06-22

## 2019-05-23 RX ORDER — ACETYLCYSTEINE 200 MG/ML
600 SOLUTION ORAL; RESPIRATORY (INHALATION) 3 TIMES DAILY PRN
Qty: 270 ML | Refills: 1 | Status: SHIPPED | OUTPATIENT
Start: 2019-05-23 | End: 2019-05-23 | Stop reason: HOSPADM

## 2019-05-23 RX ORDER — PREDNISONE 10 MG/1
TABLET ORAL
Qty: 31 TABLET | Refills: 0
Start: 2019-05-23

## 2019-05-23 RX ORDER — METHYLPREDNISOLONE SODIUM SUCCINATE 40 MG/ML
30 INJECTION, POWDER, LYOPHILIZED, FOR SOLUTION INTRAMUSCULAR; INTRAVENOUS EVERY 8 HOURS
Status: DISCONTINUED | OUTPATIENT
Start: 2019-05-24 | End: 2019-05-25

## 2019-05-23 RX ORDER — DOXYCYCLINE HYCLATE 100 MG
100 TABLET ORAL 2 TIMES DAILY
Qty: 20 TABLET | Refills: 0 | Status: SHIPPED | OUTPATIENT
Start: 2019-05-23 | End: 2019-05-23 | Stop reason: SDUPTHER

## 2019-05-23 RX ADMIN — LINEZOLID 600 MG: 600 INJECTION, SOLUTION INTRAVENOUS at 09:47

## 2019-05-23 RX ADMIN — MEROPENEM 1 G: 1 INJECTION, POWDER, FOR SOLUTION INTRAVENOUS at 14:44

## 2019-05-23 RX ADMIN — Medication 10 ML: at 19:22

## 2019-05-23 RX ADMIN — METHYLPREDNISOLONE SODIUM SUCCINATE 40 MG: 40 INJECTION, POWDER, FOR SOLUTION INTRAMUSCULAR; INTRAVENOUS at 19:03

## 2019-05-23 RX ADMIN — IPRATROPIUM BROMIDE AND ALBUTEROL SULFATE 1 AMPULE: .5; 3 SOLUTION RESPIRATORY (INHALATION) at 07:51

## 2019-05-23 RX ADMIN — METOPROLOL TARTRATE 25 MG: 25 TABLET ORAL at 09:48

## 2019-05-23 RX ADMIN — QUETIAPINE FUMARATE 25 MG: 50 TABLET ORAL at 21:00

## 2019-05-23 RX ADMIN — Medication 10 ML: at 21:06

## 2019-05-23 RX ADMIN — ALPRAZOLAM 0.5 MG: 0.5 TABLET ORAL at 14:43

## 2019-05-23 RX ADMIN — IPRATROPIUM BROMIDE AND ALBUTEROL SULFATE 1 AMPULE: .5; 3 SOLUTION RESPIRATORY (INHALATION) at 00:02

## 2019-05-23 RX ADMIN — METOPROLOL TARTRATE 25 MG: 25 TABLET ORAL at 21:01

## 2019-05-23 RX ADMIN — MEROPENEM 1 G: 1 INJECTION, POWDER, FOR SOLUTION INTRAVENOUS at 21:00

## 2019-05-23 RX ADMIN — SODIUM CHLORIDE 80 ML: 9 INJECTION, SOLUTION INTRAVENOUS at 19:22

## 2019-05-23 RX ADMIN — SPIRONOLACTONE 50 MG: 25 TABLET, FILM COATED ORAL at 09:48

## 2019-05-23 RX ADMIN — FUROSEMIDE 20 MG: 10 INJECTION, SOLUTION INTRAMUSCULAR; INTRAVENOUS at 09:48

## 2019-05-23 RX ADMIN — IPRATROPIUM BROMIDE AND ALBUTEROL SULFATE 1 AMPULE: .5; 3 SOLUTION RESPIRATORY (INHALATION) at 11:24

## 2019-05-23 RX ADMIN — IPRATROPIUM BROMIDE AND ALBUTEROL SULFATE 1 AMPULE: .5; 3 SOLUTION RESPIRATORY (INHALATION) at 20:17

## 2019-05-23 RX ADMIN — GUAIFENESIN 600 MG: 600 TABLET, EXTENDED RELEASE ORAL at 09:48

## 2019-05-23 RX ADMIN — ALPRAZOLAM 0.5 MG: 0.5 TABLET ORAL at 21:00

## 2019-05-23 RX ADMIN — LINEZOLID 600 MG: 600 INJECTION, SOLUTION INTRAVENOUS at 21:06

## 2019-05-23 RX ADMIN — MEROPENEM 1 G: 1 INJECTION, POWDER, FOR SOLUTION INTRAVENOUS at 06:25

## 2019-05-23 RX ADMIN — NYSTATIN 500000 UNITS: 500000 SUSPENSION ORAL at 09:51

## 2019-05-23 RX ADMIN — IOVERSOL 75 ML: 741 INJECTION INTRA-ARTERIAL; INTRAVENOUS at 19:22

## 2019-05-23 RX ADMIN — GUAIFENESIN 600 MG: 600 TABLET, EXTENDED RELEASE ORAL at 21:00

## 2019-05-23 RX ADMIN — METHYLPREDNISOLONE SODIUM SUCCINATE 40 MG: 40 INJECTION, POWDER, FOR SOLUTION INTRAMUSCULAR; INTRAVENOUS at 09:47

## 2019-05-23 ASSESSMENT — PAIN SCALES - GENERAL
PAINLEVEL_OUTOF10: 0
PAINLEVEL_OUTOF10: 0

## 2019-05-23 NOTE — CARE COORDINATION
ONGOING DISCHARGE PLAN:    LSW following for discharge planning to [address] The patient has their 3 midnight inpatient stay as of today. PT/OT recommending SNF. Active orders for IV lasix, merrem, zyvox, and 40mg Q8 solu-medrol. Will continue to follow for additional discharge needs.     Electronically signed by Miguel Loomis RN on 5/23/2019 at 11:44 AM

## 2019-05-23 NOTE — PROGRESS NOTES
Transport here to take patient to nursing home. Patient son came to desk stating his mom can not breathe. Nurse checked o2 sat. o2 sat 87% on 4L NC, respirations shallow and labored at 36. Nurse did breathing exercises with patient o2 sat up to 93%. Patient continues to have increase respirations and states she feels severe shortness of breath. Nurse asked [facility] to leave for now and paged Dr. Rodrigo Blackburn to update. Awaiting return call from Dr. Rodrigo Blackburn.

## 2019-05-23 NOTE — PROGRESS NOTES
Nurse attempted to call Carol Hendricks to update that patient is not being discharged tonight. Phone rang for 3 minutes with no answer.

## 2019-05-23 NOTE — CARE COORDINATION
nHpredict Inpatient Visit    Patient/family seen: Yes    Provided patient/family with copy of nHpredict tool: Yes     All questions answered at the time of visit. Informed patient/family that the nHpredict is a tool, to be used as a guide, and should not alter their currently established discharge plan.      Current discharge plan: plan is to discharge to SNF/[facility] completed with case management: Yes

## 2019-05-23 NOTE — PROGRESS NOTES
Information provided to  to call in prescription for xanax to  [address] at [facility].

## 2019-05-23 NOTE — PROGRESS NOTES
Dr. Bridget Whelan returned page. Nurse updated him that patient was to be transported to [address] [facility], but patient's son came to desk stating his mom cant breathe. Nurse checked o2 sat which was 87% on 4L. Patient pursed lip breathing respirations 36 and shallow. Patient has feeling of doom and does not want to be left alone. Nurse did breathing exercises and o2 sat did come up to 93%, however respirations still increased and shallow. Patient continues to have feeling of doom. Dr. Bridget Whelan gave orders to cancel discharge and give steroids. Stat ct chest to rule out pe.

## 2019-05-23 NOTE — CARE COORDINATION
JANE received final DC orders for this patient [facility] on this date. JANE completed and submitted the 7000 form via [employer]. JANE set up transportation and informed the staff here, the facility, and the patient and family who was present in the room of the DC/transport time. The patient is scheduled to be picked @ 6:00 pm via stretcher with O2 by [facility]. JANE provided the number for report to this patient's nurse, Vitaliy Delgado ([phone]).

## 2019-05-23 NOTE — DISCHARGE SUMMARY
[address]                 [address] [facility], [address]                               DISCHARGE SUMMARY    PATIENT NAME: Neelima Gomez                      :        10/15/1933  MED REC NO:   [id number]                              ROOM:       2121  ACCOUNT NO:   [de-identified]                           ADMIT DATE: 2019  PROVIDER:     Ronnie Victoria Bart DATE:    ADDENDUM:    COMORBIDITY:  Urinary tract infection.         Shaun BAKER    D: 2019 8:45:52       T: 2019 9:15:47     [email or web address]  Job#: [id number]     Doc#: [id number]    CC:

## 2019-05-23 NOTE — PROGRESS NOTES
Date:   5/23/2019  Patient name: Jenny Shipley  Date of admission:  5/17/2019 12:32 AM  MRN:   [id number]  YOB: 1933  PCP: Elvis Huntley MD    Reason for Admission: COPD (chronic obstructive pulmonary disease) ([facility] [J44.9]  COPD (chronic obstructive pulmonary disease) ([facility] [J44.9]  COPD (chronic obstructive pulmonary disease) ([facility] [J44.9]    Cardiology follow-up : A. fib, bradycardia                                  History of presenting illness: 80 years old female who lives with the son admitted with increasing fatigue, cough, shortness of breath and altered mental status.  She was also hallucinating.  On admission she was severely hypoxic with oxygen saturation 81% and she also had significant hypercapnia.  ABG showed severe alkalosis.  ProBNP was elevated at 29,432 and chest x-ray showed bilateral pleural effusion and interstitial lung disease.  She was started on BiPAP and diuretics.  She had made some progress over the last 48 hours.              Labs on admission and 141, potassium 2.8, CO2 more than 45 BUN 7, creatinine 0.60 glucose 189, calcium 8.5, proBNP 29,432, high sensitivity troponin 33 albumin 3.2  WBC 12.1, hemoglobin 8.7, platelets 731, MCV 88, INR 0.9  Urine examination positive for nitrite, many bacteria, WBC 5-10  ABG pH 7.542, PCO2 71, CO2 54, oxygen saturation 88 , bicarb 60.7     Labs 5/19/2019 sodium 1369, potassium 4.7, CO2 44, chloride 86, glucose 133     ECG 5/17/2019 multifocal atrial rhythm / A.  Fib  2-D echo 5/17/2019 showed ejection fraction more than 55%, moderate mitral regurgitation, RVSP 31 mmHg  Chest x-ray showed chronic diffuse interstitial opacities likely interstitial lung disease, small bilateral pleural effusion and basilar atelectasis  CT head 5/17/2019 no acute intracranial abnormality, chronic microvascular ischemic changes    Past medical history:   Congestive heart failure diastolic, ejection fraction 55%  Coronary artery disease, stents ×5  Hypertension  Peripheral vascular disease, femoral artery bruit, stents  Anemia  Severe COPD secondary to smoking  Status post bowel surgery history of septic shock in 2016  History of recurrent UTI    Vitals: /73   Pulse 73   Temp 97.2 °F (36.2 °C) (Oral)   Resp 20   Ht 5' 2\" (1.575 m)   Wt 97 lb 14.2 oz (44.4 kg)   SpO2 97%   BMI 17.90 kg/m²     Patient seen and examined  Elderly frail female  Having persistent cough  Short of breath on mild exertion  Poor appetite      Medications:   Scheduled Meds:   methylPREDNISolone  40 mg Intravenous Q8H    meropenem  1 g Intravenous Q8H    ipratropium-albuterol  1 ampule Inhalation Q4H    linezolid  600 mg Intravenous Q12H    nystatin  5 mL Oral 4x Daily    QUEtiapine  25 mg Oral Nightly    furosemide  20 mg Intravenous Daily    sodium chloride flush  10 mL Intravenous 2 times per day    spironolactone  50 mg Oral Daily    metoprolol tartrate  25 mg Oral BID    guaiFENesin  600 mg Oral BID     Continuous Infusions:  CBC: No results for input(s): WBC, HGB, PLT in the last 72 hours. BMP:    Recent Labs     05/21/19  0457 05/22/19  0525 05/23/19  0431   * 135 135   K 4.3 4.5 4.7   CL 85* 86* 85*   CO2 43* 45* 43*   BUN 20 26* 28*   CREATININE 0.56 0.61 0.64   GLUCOSE 195* 184* 185*     Hepatic: No results for input(s): AST, ALT, ALB, BILITOT, ALKPHOS in the last 72 hours. Troponin: No results for input(s): TROPONINI in the last 72 hours. BNP: No results for input(s): BNP in the last 72 hours. Lipids: No results for input(s): CHOL, HDL in the last 72 hours. Invalid input(s): LDLCALCU  INR: No results for input(s): INR in the last 72 hours. Objective:   Vitals: /73   Pulse 73   Temp 97.2 °F (36.2 °C) (Oral)   Resp 20   Ht 5' 2\" (1.575 m)   Wt 97 lb 14.2 oz (44.4 kg)   SpO2 97%   BMI 17.90 kg/m²   General appearance: alert and cooperative with exam  HEENT: Head: Normal, normocephalic, atraumatic.   Neck: no adenopathy, no carotid bruit, no JVD, supple, symmetrical, trachea midline and thyroid not enlarged, symmetric, no tenderness/mass/nodules  Lungs: Chest expansion is very poor, breath sounds are diminished  Heart: regular rate and rhythm  Abdomen: Abdomen is soft, bowel sounds present  Extremities: Homans sign is negative, no sign of DVT  Neurologic: Mental status: Alert, oriented, thought content appropriate    EKG: A. fib/ sinus rhythm, multifocal PACs  ECHO: reviewed. Ejection fraction: 55%  Stress Test: not obtained. Cardiac Angiography: not obtained.         Assessment / Acute Cardiac Problems:   Patient admitted on 5/17/2019 with respiratory failure, severe hypercapnia, severe hypoxia, severe alkalosis, hallucination and altered mental status  ABG on admission pH 7.54, PCO2 71, PCO2 54, oxygen saturation 88%, bicarb 60.7 and base excess 11.4  Diastolic congestive heart failure with bilateral pleural effusion, proBNP 29,430-1 5/17/2019  A. fib with RVR on admission at present heart rate stable, episode of A. fib with slow ventricular response heart rate 40-50 on 5/19/2019     Congestive heart failure diastolic, ejection fraction 55%  Coronary artery disease, stents ×5  Hypertension  Peripheral vascular disease, femoral artery bruit, stents  Anemia  Severe COPD secondary to smoking  Status post bowel surgery history of septic shock in 2016  History of recurrent UTI     5/21/2019 hemodynamically stable, no further episode of bradycardia  5/23/2019 hemodynamically stable          Patient Active Problem List:     Sepsis due to pneumonia ([address]     Hyponatremia     Tachycardia     Altered mental status     Hypertension     Chronic obstructive pulmonary disease with acute lower respiratory infection (HCC)     A-fib (HCC)     CAD (coronary artery disease)     Septic shock due to undetermined organism ([address]     Protein-calorie malnutrition, severe ([address]     Decubitus ulcer, stage 1     E-coli UTI     Acute cystitis     Acute on chronic diastolic congestive heart failure (HCC)     Hypophosphatemia     Non-sustained ventricular tachycardia (HCC)     Leukocytosis     Anemia     Hypokalemia     Enterococcus UTI     Urinary tract infection without hematuria     Hyperkalemia     COPD (chronic obstructive pulmonary disease) ([facility]     Severe malnutrition ([facility]      Plan of Treatment:   Continue with the beta blockers    Electronically signed by Benja Gold MD on 5/23/2019 at 2:59 PM

## 2019-05-23 NOTE — PROGRESS NOTES
Pulmonary Progress Note  Pulmonary and Critical Care Specialists      Patient - Nick Melton,  Age - 80 y.o.    - 10/15/1933      Room Number - 2121/2121-01   [facility] -  [id number]   [facility]  Date of Admission -  2019 12:32 AM        Consulting Molly Hays MD  Primary Care Physician - Nell Wade MD     SUBJECTIVE   Had several panic attacks this afternoon but seems to be back to baseline    OBJECTIVE   VITALS    height is 5' 2\" (1.575 m) and weight is 97 lb 14.2 oz (44.4 kg). Her oral temperature is 97.2 °F (36.2 °C). Her blood pressure is 114/73 and her pulse is 73. Her respiration is 20 and oxygen saturation is 97%. Body mass index is 17.9 kg/m². Temperature Range: Temp: 97.2 °F (36.2 °C) Temp  Av.7 °F (36.5 °C)  Min: 97.2 °F (36.2 °C)  Max: 98.2 °F (36.8 °C)  BP Range:  Systolic (49BZF), DLP:962 , Min:98 , GKH:140     Diastolic (85PSW), FHJ:10, Min:34, Max:73    Pulse Range: Pulse  Av.6  Min: 73  Max: 103  Respiration Range: Resp  Av.9  Min: 16  Max: 20  Current Pulse Ox[de-identified]  SpO2: 97 %  24HR Pulse Ox Range:  SpO2  Av.9 %  Min: 94 %  Max: 100 %  Oxygen Amount and Delivery: O2 Flow Rate (L/min): 4 L/min    Wt Readings from Last 3 Encounters:   19 97 lb 14.2 oz (44.4 kg)   16 103 lb 9.9 oz (47 kg)   10/02/16 105 lb 13.1 oz (48 kg)       I/O (24 Hours)    Intake/Output Summary (Last 24 hours) at 2019 1612  Last data filed at 2019 1444  Gross per 24 hour   Intake --   Output 1075 ml   Net -1075 ml       EXAM     General Appearance  Awake, alert, oriented, in no acute distress  HEENT - normocephalic, atraumatic.  []  Mallampati  [] Crowded airway   [] Macroglossia  []  Retrognathia  [] Micrognathia  []  Normal tongue size []  Normal Bite  [] [place] sign positive    Neck - Supple,  trachea midline   Lungs - diminished no rhonchi  Cardiovascular - Heart sounds are normal.  Regular rate and rhythm   Abdomen - Soft, nontender, nondistended, no masses or organomegaly  Neurologic - There are no focal motor or sensory deficits  Skin - No bruising or bleeding  Extremities - No clubbing, cyanosis, edema    MEDS      methylPREDNISolone  40 mg Intravenous Q8H    meropenem  1 g Intravenous Q8H    ipratropium-albuterol  1 ampule Inhalation Q4H    linezolid  600 mg Intravenous Q12H    nystatin  5 mL Oral 4x Daily    QUEtiapine  25 mg Oral Nightly    furosemide  20 mg Intravenous Daily    sodium chloride flush  10 mL Intravenous 2 times per day    spironolactone  50 mg Oral Daily    metoprolol tartrate  25 mg Oral BID    guaiFENesin  600 mg Oral BID       ALPRAZolam, diltiazem, sodium chloride flush, acetaminophen, acetylcysteine, perflutren lipid microspheres, potassium chloride **OR** potassium alternative oral replacement **OR** potassium chloride    LABS   CBC No results for input(s): WBC, HGB, HCT, MCV, PLT in the last 72 hours. BMP:   Lab Results   Component Value Date     05/23/2019    K 4.7 05/23/2019    CL 85 05/23/2019    CO2 43 05/23/2019    BUN 28 05/23/2019    LABALBU 3.2 05/17/2019    CREATININE 0.64 05/23/2019    CALCIUM 8.6 05/23/2019    GFRAA >60 05/23/2019    LABGLOM >60 05/23/2019     ABGs:  Lab Results   Component Value Date    PHART 7.568 05/18/2019    PO2ART 51.2 05/18/2019    ATL2NYV 57.3 05/18/2019      Lab Results   Component Value Date    MODE NOT REPORTED 05/18/2019     Ionized Calcium:  No results found for: IONCA  Magnesium:    Lab Results   Component Value Date    MG 2.4 05/17/2019     Phosphorus:    Lab Results   Component Value Date    PHOS 2.6 09/11/2016        LIVER PROFILE No results for input(s): AST, ALT, LIPASE, BILIDIR, BILITOT, ALKPHOS in the last 72 hours. Invalid input(s): AMYLASE,  ALB  INR No results for input(s): INR in the last 72 hours.   PTT   Lab Results   Component Value Date    APTT 31.3 05/17/2019         RADIOLOGY     (See actual reports for details)    ASSESSMENT/PLAN   Principal Problem:    COPD (chronic obstructive pulmonary disease) (HCC)  Active Problems:    Hypertension    A-fib (HCC)    Acute on chronic diastolic congestive heart failure (HCC)    Severe malnutrition (HCC)  Resolved Problems:    * No resolved hospital problems.  *    Discussed with primary service, will hold thoracentesis  Will switch her to Cipro to cover UTI, and doxycycline cover sputum for MRSA  Prednisone taper ordered  Will need follow-up x-ray in about 2-4 weeks  Follow up in office in 4 weeks    Electronically signed by Orly Saab MD on 5/23/2019 at 4:12 PM

## 2019-05-23 NOTE — PLAN OF CARE
Problem: Falls - Risk of:  Goal: Will remain free from falls  Description  Will remain free from falls  5/23/2019 0530 by Mariela Fleming RN  Outcome: Ongoing  Note:   The patient remained free from falls this shift, call light within reach, bed in locked and lowest position. Side rails up x2. Continue to monitor closely. Problem: Anxiety:  Goal: Level of anxiety will decrease  Description  Level of anxiety will decrease  Outcome: Ongoing  Note:   Patient taking medication as ordered. Patient appears to be less anxious when compared to night prior. Problem: Gas Exchange - Impaired:  Goal: Levels of oxygenation will improve  Description  Levels of oxygenation will improve  Outcome: Ongoing  Note:   Patient's oxygenation within normal limits. Will continue to monitor.

## 2019-05-23 NOTE — PROGRESS NOTES
Nurse left message for patients son updating him that patient is not discharging tonight d/t severe shortness of breath and labored shallow breathing.

## 2019-05-24 LAB
ANION GAP SERPL CALCULATED.3IONS-SCNC: 5 MMOL/L (ref 9–17)
BUN BLDV-MCNC: 23 MG/DL (ref 8–23)
BUN/CREAT BLD: ABNORMAL (ref 9–20)
CALCIUM SERPL-MCNC: 8.3 MG/DL (ref 8.6–10.4)
CHLORIDE BLD-SCNC: 86 MMOL/L (ref 98–107)
CO2: 42 MMOL/L (ref 20–31)
CREAT SERPL-MCNC: 0.6 MG/DL (ref 0.5–0.9)
GFR AFRICAN AMERICAN: >60 ML/MIN
GFR NON-AFRICAN AMERICAN: >60 ML/MIN
GFR SERPL CREATININE-BSD FRML MDRD: ABNORMAL ML/MIN/{1.73_M2}
GFR SERPL CREATININE-BSD FRML MDRD: ABNORMAL ML/MIN/{1.73_M2}
GLUCOSE BLD-MCNC: 186 MG/DL (ref 70–99)
POTASSIUM SERPL-SCNC: 4.8 MMOL/L (ref 3.7–5.3)
SODIUM BLD-SCNC: 133 MMOL/L (ref 135–144)

## 2019-05-24 PROCEDURE — 6360000002 HC RX W HCPCS: Performed by: INTERNAL MEDICINE

## 2019-05-24 PROCEDURE — 2580000003 HC RX 258: Performed by: INTERNAL MEDICINE

## 2019-05-24 PROCEDURE — 2580000003 HC RX 258: Performed by: FAMILY MEDICINE

## 2019-05-24 PROCEDURE — 97110 THERAPEUTIC EXERCISES: CPT

## 2019-05-24 PROCEDURE — 97530 THERAPEUTIC ACTIVITIES: CPT

## 2019-05-24 PROCEDURE — 2060000000 HC ICU INTERMEDIATE R&B

## 2019-05-24 PROCEDURE — 6370000000 HC RX 637 (ALT 250 FOR IP): Performed by: FAMILY MEDICINE

## 2019-05-24 PROCEDURE — 94640 AIRWAY INHALATION TREATMENT: CPT

## 2019-05-24 PROCEDURE — 36415 COLL VENOUS BLD VENIPUNCTURE: CPT

## 2019-05-24 PROCEDURE — 2700000000 HC OXYGEN THERAPY PER DAY

## 2019-05-24 PROCEDURE — 94761 N-INVAS EAR/PLS OXIMETRY MLT: CPT

## 2019-05-24 PROCEDURE — 6370000000 HC RX 637 (ALT 250 FOR IP): Performed by: INTERNAL MEDICINE

## 2019-05-24 PROCEDURE — 80048 BASIC METABOLIC PNL TOTAL CA: CPT

## 2019-05-24 RX ORDER — PANTOPRAZOLE SODIUM 40 MG/1
40 TABLET, DELAYED RELEASE ORAL
Status: DISCONTINUED | OUTPATIENT
Start: 2019-05-24 | End: 2019-05-26 | Stop reason: HOSPADM

## 2019-05-24 RX ADMIN — LINEZOLID 600 MG: 600 INJECTION, SOLUTION INTRAVENOUS at 22:08

## 2019-05-24 RX ADMIN — Medication 10 ML: at 09:38

## 2019-05-24 RX ADMIN — PANTOPRAZOLE SODIUM 40 MG: 40 TABLET, DELAYED RELEASE ORAL at 17:27

## 2019-05-24 RX ADMIN — GUAIFENESIN 600 MG: 600 TABLET, EXTENDED RELEASE ORAL at 09:37

## 2019-05-24 RX ADMIN — METOPROLOL TARTRATE 25 MG: 25 TABLET ORAL at 09:37

## 2019-05-24 RX ADMIN — MEROPENEM 1 G: 1 INJECTION, POWDER, FOR SOLUTION INTRAVENOUS at 23:47

## 2019-05-24 RX ADMIN — SPIRONOLACTONE 50 MG: 25 TABLET, FILM COATED ORAL at 09:37

## 2019-05-24 RX ADMIN — MEROPENEM 1 G: 1 INJECTION, POWDER, FOR SOLUTION INTRAVENOUS at 09:37

## 2019-05-24 RX ADMIN — IPRATROPIUM BROMIDE AND ALBUTEROL SULFATE 1 AMPULE: .5; 3 SOLUTION RESPIRATORY (INHALATION) at 00:35

## 2019-05-24 RX ADMIN — ALPRAZOLAM 0.5 MG: 0.5 TABLET ORAL at 15:43

## 2019-05-24 RX ADMIN — METHYLPREDNISOLONE SODIUM SUCCINATE 30 MG: 40 INJECTION, POWDER, LYOPHILIZED, FOR SOLUTION INTRAMUSCULAR; INTRAVENOUS at 23:48

## 2019-05-24 RX ADMIN — IPRATROPIUM BROMIDE AND ALBUTEROL SULFATE 1 AMPULE: .5; 3 SOLUTION RESPIRATORY (INHALATION) at 09:10

## 2019-05-24 RX ADMIN — LINEZOLID 600 MG: 600 INJECTION, SOLUTION INTRAVENOUS at 10:47

## 2019-05-24 RX ADMIN — ALPRAZOLAM 0.5 MG: 0.5 TABLET ORAL at 20:49

## 2019-05-24 RX ADMIN — METOPROLOL TARTRATE 25 MG: 25 TABLET ORAL at 20:45

## 2019-05-24 RX ADMIN — METHYLPREDNISOLONE SODIUM SUCCINATE 30 MG: 40 INJECTION, POWDER, LYOPHILIZED, FOR SOLUTION INTRAMUSCULAR; INTRAVENOUS at 01:15

## 2019-05-24 RX ADMIN — GUAIFENESIN 600 MG: 600 TABLET, EXTENDED RELEASE ORAL at 20:44

## 2019-05-24 RX ADMIN — METHYLPREDNISOLONE SODIUM SUCCINATE 30 MG: 40 INJECTION, POWDER, LYOPHILIZED, FOR SOLUTION INTRAMUSCULAR; INTRAVENOUS at 17:24

## 2019-05-24 RX ADMIN — IPRATROPIUM BROMIDE AND ALBUTEROL SULFATE 1 AMPULE: .5; 3 SOLUTION RESPIRATORY (INHALATION) at 17:57

## 2019-05-24 RX ADMIN — ALPRAZOLAM 0.5 MG: 0.5 TABLET ORAL at 06:24

## 2019-05-24 RX ADMIN — FUROSEMIDE 20 MG: 10 INJECTION, SOLUTION INTRAMUSCULAR; INTRAVENOUS at 09:37

## 2019-05-24 RX ADMIN — QUETIAPINE FUMARATE 25 MG: 50 TABLET ORAL at 20:45

## 2019-05-24 RX ADMIN — METHYLPREDNISOLONE SODIUM SUCCINATE 30 MG: 40 INJECTION, POWDER, LYOPHILIZED, FOR SOLUTION INTRAMUSCULAR; INTRAVENOUS at 09:37

## 2019-05-24 RX ADMIN — MEROPENEM 1 G: 1 INJECTION, POWDER, FOR SOLUTION INTRAVENOUS at 15:12

## 2019-05-24 RX ADMIN — ACETAMINOPHEN 650 MG: 325 TABLET, FILM COATED ORAL at 06:23

## 2019-05-24 ASSESSMENT — PAIN SCALES - GENERAL
PAINLEVEL_OUTOF10: 0
PAINLEVEL_OUTOF10: 0
PAINLEVEL_OUTOF10: 6

## 2019-05-24 NOTE — PROGRESS NOTES
Pulmonary Progress Note  [facility]      Patient - Juliana Mendosa,  Age - 80 y.o.    - 10/15/1933      Room Number - 2121/2121-01   N -  [id number]   [facility] - [de-identified]  Date of Admission -  2019 12:32 AM        Consulting Jeramy Peace MD  Primary Care Physician - Trenda Klinefelter, MD     SUBJECTIVE   Patient very short of breath, says she's having a panic attack    OBJECTIVE   VITALS    height is 5' 2\" (1.575 m) and weight is 106 lb 7.7 oz (48.3 kg). Her oral temperature is 97.5 °F (36.4 °C). Her blood pressure is 116/45 (abnormal) and her pulse is 82. Her respiration is 16 and oxygen saturation is 99%. Body mass index is 19.48 kg/m². Temperature Range: Temp: 97.5 °F (36.4 °C) Temp  Av.7 °F (36.5 °C)  Min: 97.4 °F (36.3 °C)  Max: 98.2 °F (36.8 °C)  BP Range:  Systolic (27ASB), AI , Min:110 , GMK:769     Diastolic (30GBY), IGW:08, Min:45, Max:75    Pulse Range: Pulse  Av.6  Min: 66  Max: 95  Respiration Range: Resp  Av.3  Min: 16  Max: 36  Current Pulse Ox[de-identified]  SpO2: 99 %  24HR Pulse Ox Range:  SpO2  Av.5 %  Min: 87 %  Max: 100 %  Oxygen Amount and Delivery: O2 Flow Rate (L/min): 4 L/min    Wt Readings from Last 3 Encounters:   19 106 lb 7.7 oz (48.3 kg)   16 103 lb 9.9 oz (47 kg)   10/02/16 105 lb 13.1 oz (48 kg)       I/O (24 Hours)    Intake/Output Summary (Last 24 hours) at 2019 3869  Last data filed at 2019 1155  Gross per 24 hour   Intake 550 ml   Output --   Net 550 ml       EXAM     General Appearance  Awake, alert, oriented, in no acute distress  HEENT - normocephalic, atraumatic.  []  Mallampati  [] Crowded airway   [] Macroglossia  []  Retrognathia  [] Micrognathia  []  Normal tongue size []  Normal Bite  [] Mary sign positive    Neck - Supple,  trachea midline   Lungs - diffuse wheezes  Cardiovascular - Heart sounds are normal.  Regular rate and rhythm Abdomen - Soft, nontender, nondistended, no masses or organomegaly  Neurologic - There are no focal motor or sensory deficits  Skin - No bruising or bleeding  Extremities - No clubbing, cyanosis, edema    MEDS      pantoprazole  40 mg Oral QAM AC    methylPREDNISolone  30 mg Intravenous Q8H    meropenem  1 g Intravenous Q8H    ipratropium-albuterol  1 ampule Inhalation Q4H    linezolid  600 mg Intravenous Q12H    nystatin  5 mL Oral 4x Daily    QUEtiapine  25 mg Oral Nightly    furosemide  20 mg Intravenous Daily    sodium chloride flush  10 mL Intravenous 2 times per day    spironolactone  50 mg Oral Daily    metoprolol tartrate  25 mg Oral BID    guaiFENesin  600 mg Oral BID       ALPRAZolam, diltiazem, sodium chloride flush, acetaminophen, acetylcysteine, perflutren lipid microspheres, potassium chloride **OR** potassium alternative oral replacement **OR** potassium chloride    LABS   CBC No results for input(s): WBC, HGB, HCT, MCV, PLT in the last 72 hours. BMP:   Lab Results   Component Value Date     05/24/2019    K 4.8 05/24/2019    CL 86 05/24/2019    CO2 42 05/24/2019    BUN 23 05/24/2019    LABALBU 3.2 05/17/2019    CREATININE 0.60 05/24/2019    CALCIUM 8.3 05/24/2019    GFRAA >60 05/24/2019    LABGLOM >60 05/24/2019     ABGs:  Lab Results   Component Value Date    PHART 7.568 05/18/2019    PO2ART 51.2 05/18/2019    EYP9ZNN 57.3 05/18/2019      Lab Results   Component Value Date    MODE NOT REPORTED 05/18/2019     Ionized Calcium:  No results found for: IONCA  Magnesium:    Lab Results   Component Value Date    MG 2.4 05/17/2019     Phosphorus:    Lab Results   Component Value Date    PHOS 2.6 09/11/2016        LIVER PROFILE No results for input(s): AST, ALT, LIPASE, BILIDIR, BILITOT, ALKPHOS in the last 72 hours. Invalid input(s): AMYLASE,  ALB  INR No results for input(s): INR in the last 72 hours.   PTT   Lab Results   Component Value Date    APTT 31.3 05/17/2019 RADIOLOGY     (See actual reports for details)    ASSESSMENT/PLAN   Principal Problem:    COPD (chronic obstructive pulmonary disease) (HCC)  Active Problems:    Hypertension    A-fib (HCC)    Acute on chronic diastolic congestive heart failure (HCC)    Severe malnutrition (HCC)  Resolved Problems:    * No resolved hospital problems.  *    Review of records, patient has only received one treatment the whole day (she is supposed to be getting every 4 hours)  Her panic attack is actually worsening wheezing and dyspnea  Will increase Solu-Medrol  Hold discharge  Electronically signed by Noemi Hernández MD on 5/24/2019 at 5:49 PM

## 2019-05-24 NOTE — PROGRESS NOTES
RN spoke with Dr Lita Lassiter regarding CT results. New order for blood cultures x2 and unasyn. RN spoke with pharmacy Unasyn is currently out of stock. Pt currently on zyvox and merrem.

## 2019-05-24 NOTE — CARE COORDINATION
DISCHARGE PLANNING NOTE:      LSW is following for discharge to the [address] and patient is scheduled for  at 6pm with O2 by Millicent Moscoso. PT/OT recommends skilled nursing facility. Will continue to follow.

## 2019-05-24 NOTE — PROGRESS NOTES
Date:   5/24/2019  Patient name: Thyra Kayser  Date of admission:  5/17/2019 12:32 AM  MRN:   [id number]  YOB: 1933  PCP: Mihai Perez MD    Reason for Admission: COPD (chronic obstructive pulmonary disease) ([facility] [J44.9]  COPD (chronic obstructive pulmonary disease) ([facility] [J44.9]  COPD (chronic obstructive pulmonary disease) ([facility] [J44.9]    Cardiology follow-up :       History of presenting illness: 80 years old female who lives with the son admitted with increasing fatigue, cough, shortness of breath and altered mental status.  She was also hallucinating.  On admission she was severely hypoxic with oxygen saturation 81% and she also had significant hypercapnia.  ABG showed severe alkalosis.  ProBNP was elevated at 29,432 and chest x-ray showed bilateral pleural effusion and interstitial lung disease.  She was started on BiPAP and diuretics.  She had made some progress over the last 48 hours.     Labs on admission and 141, potassium 2.8, CO2 more than 45 BUN 7, creatinine 0.60 glucose 189, calcium 8.5, proBNP 29,432, high sensitivity troponin 33 albumin 3.2  WBC 12.1, hemoglobin 8.7, platelets 832, MCV 88, INR 0.9  Urine examination positive for nitrite, many bacteria, WBC 5-10  ABG pH 7.542, PCO2 71, CO2 54, oxygen saturation 88 , bicarb 60.7     Labs 5/19/2019 sodium 1369, potassium 4.7, CO2 44, chloride 86, glucose 133     ECG 5/17/2019 multifocal atrial rhythm / A.  Fib  2-D echo 5/17/2019 showed ejection fraction more than 55%, moderate mitral regurgitation, RVSP 31 mmHg  Chest x-ray showed chronic diffuse interstitial opacities likely interstitial lung disease, small bilateral pleural effusion and basilar atelectasis  CT head 5/17/2019 no acute intracranial abnormality, chronic microvascular ischemic changes    Past medical history:   Congestive heart failure diastolic, ejection fraction 55%  Coronary artery disease, stents ×5  Hypertension  Peripheral vascular disease, femoral artery bruit, stents  Anemia  Severe COPD secondary to smoking  Status post bowel surgery history of septic shock in 2016  History of recurrent UTI    Vitals: BP (!) 116/45   Pulse 82   Temp 97.5 °F (36.4 °C) (Oral)   Resp 16   Ht 5' 2\" (1.575 m)   Wt 106 lb 7.7 oz (48.3 kg)   SpO2 99%   BMI 19.48 kg/m²     Patient seen and examined  Elderly frail looking female  Complaining of persistent cough and generalized weakness  Complaining of heartburn, abdominal fullness is a small meal  No vomiting no diarrhea     Medications:   Scheduled Meds:   methylPREDNISolone  30 mg Intravenous Q8H    meropenem  1 g Intravenous Q8H    ipratropium-albuterol  1 ampule Inhalation Q4H    linezolid  600 mg Intravenous Q12H    nystatin  5 mL Oral 4x Daily    QUEtiapine  25 mg Oral Nightly    furosemide  20 mg Intravenous Daily    sodium chloride flush  10 mL Intravenous 2 times per day    spironolactone  50 mg Oral Daily    metoprolol tartrate  25 mg Oral BID    guaiFENesin  600 mg Oral BID     Continuous Infusions:  CBC: No results for input(s): WBC, HGB, PLT in the last 72 hours. BMP:    Recent Labs     05/22/19  0525 05/23/19  0431 05/24/19  0419    135 133*   K 4.5 4.7 4.8   CL 86* 85* 86*   CO2 45* 43* 42*   BUN 26* 28* 23   CREATININE 0.61 0.64 0.60   GLUCOSE 184* 185* 186*     Hepatic: No results for input(s): AST, ALT, ALB, BILITOT, ALKPHOS in the last 72 hours. Troponin: No results for input(s): TROPONINI in the last 72 hours. BNP: No results for input(s): BNP in the last 72 hours. Lipids: No results for input(s): CHOL, HDL in the last 72 hours. Invalid input(s): LDLCALCU  INR: No results for input(s): INR in the last 72 hours. Objective:   Vitals: BP (!) 116/45   Pulse 82   Temp 97.5 °F (36.4 °C) (Oral)   Resp 16   Ht 5' 2\" (1.575 m)   Wt 106 lb 7.7 oz (48.3 kg)   SpO2 99%   BMI 19.48 kg/m²   General appearance: alert and cooperative with exam  HEENT: Head: Normal, normocephalic, atraumatic.   Neck: no adenopathy, no carotid bruit, no JVD, supple, symmetrical, trachea midline and thyroid not enlarged, symmetric, no tenderness/mass/nodules  Lungs: Chest expansion is a very poor, breath sounds are diminished both side, bilateral basal crackles  Heart: irregularly irregular rhythm  Abdomen: Abdomen appears mildly distended, epigastric tenderness, bowel sounds present  Extremities: Homans sign is negative, no sign of DVT  Neurologic: Mental status: Alert, oriented, thought content appropriate    EKG: atrial fibrillation, rate 80, unchanged from previous tracings. ECHO: reviewed. Ejection fraction: 55%  Stress Test: not obtained. Cardiac Angiography: not obtained.         Assessment / Acute Cardiac Problems:   Patient admitted on 5/17/2019 with respiratory failure, severe hypercapnia, severe hypoxia, severe alkalosis, hallucination and altered mental status  ABG on admission pH 7.54, PCO2 71, PCO2 54, oxygen saturation 88%, bicarb 60.7 and base excess 43.0  Diastolic congestive heart failure with bilateral pleural effusion, proBNP 29,430-1 5/17/2019  A. fib with RVR on admission at present heart rate stable, episode of A. fib with slow ventricular response heart rate 40-50 on 5/19/2019     Congestive heart failure diastolic, ejection fraction 55%  Coronary artery disease, stents ×5  Hypertension  Peripheral vascular disease, femoral artery bruit, stents  Anemia  Severe COPD secondary to smoking  Status post bowel surgery history of septic shock in 2016  History of recurrent UTI     5/21/2019 hemodynamically stable, no further episode of bradycardia  5/23/2019 hemodynamically stable  5/24/2019 complaining abdominal fullness, heartburn, continued to have shortness of breath on mild exertion      Patient Active Problem List:     Sepsis due to pneumonia ([facility]     Hyponatremia     Tachycardia     Altered mental status     Hypertension     Chronic obstructive pulmonary disease with acute lower respiratory infection

## 2019-05-24 NOTE — CARE COORDINATION
nHpredict Inpatient Visit    Patient/family seen: Yes    Provided patient/family with copy of nHpredict tool: Yes     All questions answered at the time of visit. Informed patient/family that the nHpredict is a tool, to be used as a guide, and should not alter their currently established discharge plan.      Current discharge plan: plan is to go to [facility] when discharged    Collaboration completed with case management: Yes

## 2019-05-24 NOTE — PLAN OF CARE
Problem: Falls - Risk of:  Goal: Will remain free from falls  Description  Will remain free from falls  Outcome: Ongoing  Note:   Pt remained absent from falls. Call light within reach. Bed locked and in lowest position. Problem: Activity Intolerance:  Goal: Ability to tolerate increased activity will improve  Description  Ability to tolerate increased activity will improve  Outcome: Ongoing  Note:   ADLs with max assisstance. Problem: Anxiety:  Goal: Level of anxiety will decrease  Description  Level of anxiety will decrease  Outcome: Ongoing  Note:   Pt able to verbalize needs. Voices no concerns at this time. Will continue to monitor.

## 2019-05-24 NOTE — PROGRESS NOTES
Physical Therapy  Facility/Department: [facility] PROGRESSIVE CARE  Daily Treatment Note  NAME: Theron Michelle  : 10/15/1933  MRN: [id number]    Date of Service: 2019    Discharge Recommendations:  Subacute/Skilled Nursing Facility, Continue to assess pending progress        Patient Diagnosis(es): The primary encounter diagnosis was Paroxysmal atrial fibrillation ([facility] Diagnoses of Hypoxia and Transient alteration of awareness were also pertinent to this visit. has a past medical history of Arthritis, Atrial fibrillation ([facility] COPD (chronic obstructive pulmonary disease) ([facility] Diverticulitis, Hypertension, MI, old, and Thyroid disease. has a past surgical history that includes hernia repair; Abdomen surgery; Cardiac surgery; and Bunionectomy (Left). Restrictions  Restrictions/Precautions  Restrictions/Precautions: Isolation, Fall Risk, Up as Tolerated(Keep O2 sat above 88%)  Required Braces or Orthoses?: No  Implants present? : (none)  Position Activity Restriction  Other position/activity restrictions: 4L O2/nc  Subjective   General  Additional Pertinent Hx: This 17-year-old female, presents with recent onset of increasing shortness of breath. She has been coughing a lot more and bringing up copious amount of mucus. Her past medical history consists of COPD, essential hypertension, atrial fibrillation, and coronary artery disease. Response To Previous Treatment: Not applicable  Family / Caregiver Present: No  Referring Practitioner: Ashley Salazar MD  Subjective  Subjective: Pt waking up, agreeable to therapy. General Comment  Comments: Pt very cooperative.   Pain Screening  Patient Currently in Pain: Denies  Pain Assessment  Pain Assessment: 0-10  Pain Level: 0  Vital Signs  Patient Currently in Pain: Denies  Oxygen Therapy  SpO2: 95 %  Pulse Oximeter Device Mode: Intermittent  Pulse Oximeter Device Location: Finger  O2 Device: Nasal cannula       Orientation  Orientation  Overall Orientation Status: Within Functional Limits  Cognition      Objective   Bed mobility  Scooting: Minimal assistance  Transfers  Sit to Stand: Contact guard assistance  Stand to sit: Contact guard assistance  Bed to Chair: Contact guard assistance  Ambulation  Ambulation?: Yes  More Ambulation?: No  Ambulation 1  Surface: level tile  Device: Rolling Walker(RW)  Other Apparatus: O2(3.5L)  Quality of Gait: Decreased foot clearance, step length, and heel strike bilaterally. Distance: 5 steps  Comments: Pt v.c for hand placement. Stairs/Curb  Stairs?: No     Balance  Posture: Fair  Sitting - Static: Good;-  Sitting - Dynamic: Fair;+  Standing - Static: Fair;+(RW)  Standing - Dynamic: Fair(RW)  Other exercises  Other exercises?: Yes  Other exercises 1: AROM both L/E supine and seated x 10  Other exercises 2: Bed Mobility         Other Activities: Dangling protocol(5 mins)  Comment: Assist nursing w/ changing pt supine w/ rolling. Assist Pt w/ standing to sit on bedpan in chair. Assessment   Body structures, Functions, Activity limitations: Decreased functional mobility ; Decreased strength;Decreased endurance;Decreased balance  Assessment: Patient presents with decreased functional mobility due to decreased strength (grossly 4/5 BLE), transfer ability (mod assist for supine to sit, min assist for scooting, and SBA for sit to supine) and unable to assess sit to stand transfers or gait due to fluctuating O2 levels via NC  Treatment Diagnosis: Decreased functional mobility  Specific instructions for Next Treatment: FALL RISK, on 3L O2, assess gait and stairs when O2 levels are stable  Prognosis: Fair  Patient Education: per POC (safety and mobility education)  REQUIRES PT FOLLOW UP: Yes  Activity Tolerance  Activity Tolerance: Patient limited by fatigue;Patient limited by endurance     G-Code     OutComes Score                                                  AM-PAC Score             Goals  Short term goals  Time Frame for Short term goals: 5-7 treatments/week  Short term goal 1: Patient will demonstrate ability to move from supine to sit from Mod A of 1 to CGA inorder to improve independence at home   Short term goal 2: Patient will demonstrate ability to transfer from sit to supine MOD I inorder to improve independence at home  Short term goal 3: Patient will demonstrate increased ability to sit at the EOB for 10 minutes with supervision in order to improve trunk stability and core strength  Short term goal 4: Patient will demonstrate ability to transfer from sit to  order to improve functional mobility  Short term goal 5: Patient will demonstrate ability to walk 10 to 20ft with RW and mod assist of 1 with O2 at 3-4L via NC in order to improve functional mobility in the home   Short term goal 6: Patient will demonstrate ability to ascend and descend a 4 to 6 inch platform step with mod assist of 1 inorder to improve ability to negotiate stairs  Patient Goals   Patient goals : Patient's goal is to be able to cough up sputum and to be more mobile    Plan    Plan  Times per week: 5-7 treatments/ week  Specific instructions for Next Treatment: FALL RISK, on 3L O2, assess gait and stairs when O2 levels are stable  Current Treatment Recommendations: Strengthening, Balance Training, Functional Mobility Training, Transfer Training, Endurance Training, Gait Training, Home Exercise Program, Safety Education & Training, Patient/Caregiver Education & Training  Safety Devices  Type of devices: Gait belt, Bed alarm in place, Call light within reach, All fall risk precautions in place, Patient at risk for falls, Left in bed     Therapy Time   Individual Concurrent Group Co-treatment   Time In 0940         Time Out 1020         Minutes [address], PTA   Electronically signed by Joseph Castorena PTA on 5/24/2019 at 10:45 AM

## 2019-05-25 VITALS
SYSTOLIC BLOOD PRESSURE: 145 MMHG | WEIGHT: 106.48 LBS | OXYGEN SATURATION: 96 % | HEIGHT: 62 IN | HEART RATE: 85 BPM | BODY MASS INDEX: 19.6 KG/M2 | DIASTOLIC BLOOD PRESSURE: 45 MMHG | RESPIRATION RATE: 20 BRPM | TEMPERATURE: 97.4 F

## 2019-05-25 LAB
ANION GAP SERPL CALCULATED.3IONS-SCNC: 6 MMOL/L (ref 9–17)
BUN BLDV-MCNC: 25 MG/DL (ref 8–23)
BUN/CREAT BLD: ABNORMAL (ref 9–20)
CALCIUM SERPL-MCNC: 8.4 MG/DL (ref 8.6–10.4)
CHLORIDE BLD-SCNC: 86 MMOL/L (ref 98–107)
CO2: 39 MMOL/L (ref 20–31)
CREAT SERPL-MCNC: 0.5 MG/DL (ref 0.5–0.9)
GFR AFRICAN AMERICAN: >60 ML/MIN
GFR NON-AFRICAN AMERICAN: >60 ML/MIN
GFR SERPL CREATININE-BSD FRML MDRD: ABNORMAL ML/MIN/{1.73_M2}
GFR SERPL CREATININE-BSD FRML MDRD: ABNORMAL ML/MIN/{1.73_M2}
GLUCOSE BLD-MCNC: 125 MG/DL (ref 65–105)
GLUCOSE BLD-MCNC: 182 MG/DL (ref 70–99)
GLUCOSE BLD-MCNC: 201 MG/DL (ref 65–105)
GLUCOSE BLD-MCNC: 244 MG/DL (ref 65–105)
POTASSIUM SERPL-SCNC: 5.4 MMOL/L (ref 3.7–5.3)
SODIUM BLD-SCNC: 131 MMOL/L (ref 135–144)

## 2019-05-25 PROCEDURE — 94761 N-INVAS EAR/PLS OXIMETRY MLT: CPT

## 2019-05-25 PROCEDURE — 6360000002 HC RX W HCPCS: Performed by: INTERNAL MEDICINE

## 2019-05-25 PROCEDURE — 2700000000 HC OXYGEN THERAPY PER DAY

## 2019-05-25 PROCEDURE — 6370000000 HC RX 637 (ALT 250 FOR IP): Performed by: FAMILY MEDICINE

## 2019-05-25 PROCEDURE — 6370000000 HC RX 637 (ALT 250 FOR IP): Performed by: INTERNAL MEDICINE

## 2019-05-25 PROCEDURE — 82947 ASSAY GLUCOSE BLOOD QUANT: CPT

## 2019-05-25 PROCEDURE — 36415 COLL VENOUS BLD VENIPUNCTURE: CPT

## 2019-05-25 PROCEDURE — 2060000000 HC ICU INTERMEDIATE R&B

## 2019-05-25 PROCEDURE — 2580000003 HC RX 258: Performed by: INTERNAL MEDICINE

## 2019-05-25 PROCEDURE — 2580000003 HC RX 258: Performed by: FAMILY MEDICINE

## 2019-05-25 PROCEDURE — 94640 AIRWAY INHALATION TREATMENT: CPT

## 2019-05-25 PROCEDURE — 80048 BASIC METABOLIC PNL TOTAL CA: CPT

## 2019-05-25 RX ORDER — PREDNISONE 20 MG/1
20 TABLET ORAL DAILY
Status: DISCONTINUED | OUTPATIENT
Start: 2019-05-25 | End: 2019-05-26 | Stop reason: HOSPADM

## 2019-05-25 RX ADMIN — METOPROLOL TARTRATE 25 MG: 25 TABLET ORAL at 21:41

## 2019-05-25 RX ADMIN — NYSTATIN 500000 UNITS: 500000 SUSPENSION ORAL at 14:24

## 2019-05-25 RX ADMIN — MEROPENEM 1 G: 1 INJECTION, POWDER, FOR SOLUTION INTRAVENOUS at 06:20

## 2019-05-25 RX ADMIN — METHYLPREDNISOLONE SODIUM SUCCINATE 30 MG: 40 INJECTION, POWDER, LYOPHILIZED, FOR SOLUTION INTRAMUSCULAR; INTRAVENOUS at 08:11

## 2019-05-25 RX ADMIN — LINEZOLID 600 MG: 600 INJECTION, SOLUTION INTRAVENOUS at 10:09

## 2019-05-25 RX ADMIN — FUROSEMIDE 20 MG: 10 INJECTION, SOLUTION INTRAMUSCULAR; INTRAVENOUS at 08:10

## 2019-05-25 RX ADMIN — NYSTATIN 500000 UNITS: 500000 SUSPENSION ORAL at 08:12

## 2019-05-25 RX ADMIN — IPRATROPIUM BROMIDE AND ALBUTEROL SULFATE 1 AMPULE: .5; 3 SOLUTION RESPIRATORY (INHALATION) at 11:32

## 2019-05-25 RX ADMIN — IPRATROPIUM BROMIDE AND ALBUTEROL SULFATE 1 AMPULE: .5; 3 SOLUTION RESPIRATORY (INHALATION) at 07:15

## 2019-05-25 RX ADMIN — ALPRAZOLAM 0.5 MG: 0.5 TABLET ORAL at 08:10

## 2019-05-25 RX ADMIN — ALPRAZOLAM 0.5 MG: 0.5 TABLET ORAL at 17:11

## 2019-05-25 RX ADMIN — NYSTATIN 500000 UNITS: 500000 SUSPENSION ORAL at 21:41

## 2019-05-25 RX ADMIN — METOPROLOL TARTRATE 25 MG: 25 TABLET ORAL at 08:10

## 2019-05-25 RX ADMIN — MEROPENEM 1 G: 1 INJECTION, POWDER, FOR SOLUTION INTRAVENOUS at 14:24

## 2019-05-25 RX ADMIN — GUAIFENESIN 600 MG: 600 TABLET, EXTENDED RELEASE ORAL at 21:40

## 2019-05-25 RX ADMIN — PANTOPRAZOLE SODIUM 40 MG: 40 TABLET, DELAYED RELEASE ORAL at 06:19

## 2019-05-25 RX ADMIN — IPRATROPIUM BROMIDE AND ALBUTEROL SULFATE 1 AMPULE: .5; 3 SOLUTION RESPIRATORY (INHALATION) at 15:14

## 2019-05-25 RX ADMIN — METHYLPREDNISOLONE SODIUM SUCCINATE 30 MG: 40 INJECTION, POWDER, LYOPHILIZED, FOR SOLUTION INTRAMUSCULAR; INTRAVENOUS at 15:34

## 2019-05-25 RX ADMIN — GUAIFENESIN 600 MG: 600 TABLET, EXTENDED RELEASE ORAL at 08:10

## 2019-05-25 RX ADMIN — NYSTATIN 500000 UNITS: 500000 SUSPENSION ORAL at 17:12

## 2019-05-25 RX ADMIN — Medication 10 ML: at 21:41

## 2019-05-25 RX ADMIN — DILTIAZEM HYDROCHLORIDE 30 MG: 30 TABLET, FILM COATED ORAL at 08:10

## 2019-05-25 RX ADMIN — SPIRONOLACTONE 50 MG: 25 TABLET, FILM COATED ORAL at 08:10

## 2019-05-25 RX ADMIN — IPRATROPIUM BROMIDE AND ALBUTEROL SULFATE 1 AMPULE: .5; 3 SOLUTION RESPIRATORY (INHALATION) at 19:12

## 2019-05-25 RX ADMIN — QUETIAPINE FUMARATE 25 MG: 50 TABLET ORAL at 21:40

## 2019-05-25 NOTE — CARE COORDINATION
DISCHARGE PLANNING NOTE:    Plan is for this patient to [facility]. Patient is accepted there and has 3 midnight qualifying stay. Will go to room 123 at [facility]. Call report to [phone]    Fax discharge orders/completed [address] to [phone].      Electronically signed by Mayito Gallo RN on 5/25/2019 at 1:56 PM

## 2019-05-25 NOTE — PLAN OF CARE
VSS assessments as charted. Labs and tests reviewed and reported PRN. Meds and fluids given as ordered.

## 2019-05-25 NOTE — PROGRESS NOTES
[facility]  Progress Note    5/25/2019 5:41 PM  Subjective: Interval History: No new problems, no CP or SOB, no HA or dizziness, no panic attacks, eating OK. Diet: DIET GENERAL;  Dietary Nutrition Supplements: Standard High Calorie Oral Supplement    Medications:   Reviewed medications    Labs:   CBC: No results for input(s): WBC, HGB, PLT in the last 72 hours. BMP:    Recent Labs     05/25/19  0429   *   K 5.4*   CL 86*   CO2 39*   BUN 25*   CREATININE 0.50   GLUCOSE 182*     Hepatic: No results for input(s): AST, ALT, ALB, BILITOT, ALKPHOS in the last 72 hours. Troponin: No results for input(s): TROPONINI in the last 72 hours. BNP: No results for input(s): BNP in the last 72 hours. Lipids: No results for input(s): CHOL, HDL in the last 72 hours. Invalid input(s): LDLCALCU  INR: No results for input(s): INR in the last 72 hours.       Objective:   Vitals: BP (!) 114/37   Pulse 61   Temp 97 °F (36.1 °C) (Oral)   Resp 16   Ht 5' 2\" (1.575 m)   Wt 106 lb 7.7 oz (48.3 kg)   SpO2 95%   BMI 19.48 kg/m²   General appearance: alert and cooperative with exam  Neck: no adenopathy, no carotid bruit, no JVD, supple, symmetrical, trachea midline and thyroid not enlarged, symmetric, no tenderness/mass/nodules  Lungs: clear to auscultation bilaterally  Heart: regular rate and rhythm, S1, S2 normal, no murmur, click, rub or gallop  Abdomen: soft, non-tender; bowel sounds normal; no masses,  no organomegaly  Extremities: extremities normal, atraumatic, no cyanosis or edema  Neurologic: Mental status: Alert, oriented, thought content appropriate    Assessment:     Patient Active Problem List    Diagnosis Date Noted    Severe malnutrition ([facility] 05/19/2019    COPD (chronic obstructive pulmonary disease) ([facility] 05/17/2019    Hyperkalemia 10/01/2016    Anemia 09/29/2016    Hypokalemia 09/29/2016    Enterococcus UTI 09/29/2016    Urinary tract infection without hematuria 09/29/2016    Leukocytosis  Hypophosphatemia 09/09/2016    Non-sustained ventricular tachycardia ([facility] 09/09/2016    [facility] on [facility] 09/08/2016    E-coli UTI 09/07/2016    Acute cystitis     Septic shock due to undetermined organism [facility] 09/06/2016    Protein-calorie malnutrition, severe ([facility] 09/06/2016    Decubitus ulcer, stage 1 09/06/2016    Sepsis due to pneumonia ([facility] 09/04/2016    Hyponatremia 09/04/2016    Tachycardia 09/04/2016    Altered mental status 09/04/2016    Hypertension 09/04/2016    Chronic obstructive pulmonary disease with acute lower respiratory infection ([facility] 09/04/2016    A-fib ([facility] 09/04/2016    CAD (coronary artery disease) 09/04/2016        Plan:   1.  D/C when OK with Pulm/Card    Electronically signed by Huy Moscoso MD on 5/25/2019 at 5:41 PM

## 2019-05-25 NOTE — PROGRESS NOTES
AFTER PLACING PT ON 5LNC I WAITED AND SHE ASKED FOR ME TO HELP HER GET COMFORTABLE IN BED. HER SATS AGAIN STARTED TO DECLINE INTO THE 80'S. I THEN PLACED PT ON VENTI MASK AT 50% 12L SHE INCREASED TO 94% AGAIN I WAITED.  I PLACED HER BACK ON 5LNC AND PT WAS SPO2 WAS 94% I WENT AND INFORMED NURSE TO CONTINUE TO MONITOR PT.

## 2019-05-25 NOTE — PROGRESS NOTES
comfortable, following commands  Skin - No bruising or bleeding  Extremities - No clubbing, cyanosis, edema    MEDS      pantoprazole  40 mg Oral QAM AC    methylPREDNISolone  30 mg Intravenous Q8H    meropenem  1 g Intravenous Q8H    ipratropium-albuterol  1 ampule Inhalation Q4H    linezolid  600 mg Intravenous Q12H    nystatin  5 mL Oral 4x Daily    QUEtiapine  25 mg Oral Nightly    furosemide  20 mg Intravenous Daily    sodium chloride flush  10 mL Intravenous 2 times per day    spironolactone  50 mg Oral Daily    metoprolol tartrate  25 mg Oral BID    guaiFENesin  600 mg Oral BID       ALPRAZolam, diltiazem, sodium chloride flush, acetaminophen, acetylcysteine, perflutren lipid microspheres, potassium chloride **OR** potassium alternative oral replacement **OR** potassium chloride    LABS   CBC No results for input(s): WBC, HGB, HCT, MCV, PLT in the last 72 hours. BMP:   Lab Results   Component Value Date     05/25/2019    K 5.4 05/25/2019    CL 86 05/25/2019    CO2 39 05/25/2019    BUN 25 05/25/2019    LABALBU 3.2 05/17/2019    CREATININE 0.50 05/25/2019    CALCIUM 8.4 05/25/2019    GFRAA >60 05/25/2019    LABGLOM >60 05/25/2019     ABGs:  Lab Results   Component Value Date    PHART 7.568 05/18/2019    PO2ART 51.2 05/18/2019    FLB5KFZ 57.3 05/18/2019      Lab Results   Component Value Date    MODE NOT REPORTED 05/18/2019     Ionized Calcium:  No results found for: IONCA  Magnesium:    Lab Results   Component Value Date    MG 2.4 05/17/2019      Phosphorus:    Lab Results   Component Value Date    PHOS 2.6 09/11/2016        LIVER PROFILE No results for input(s): AST, ALT, LIPASE, BILIDIR, BILITOT, ALKPHOS in the last 72 hours. Invalid input(s): AMYLASE,  ALB  INR No results for input(s): INR in the last 72 hours. PTT No results for input(s): APTT in the last 72 hours. BNP No results for input(s): BNP in the last 72 hours.     RADIOLOGY     (See actual reports for

## 2019-05-25 NOTE — PROGRESS NOTES
CAME INTO ROOM TO GIVE DUONEB TX PT SPO2 WAS LOW 80'S ON 4LNC. DURING TX PT SPO2 INCREASED TO 98.   AFTER TX INCREASED TO 5LNC SPO2 AT 98%

## 2019-05-25 NOTE — PROGRESS NOTES
Date:   5/25/2019  Patient name: Lillian Nowak  Date of admission:  5/17/2019 12:32 AM  MRN:   [id number]  YOB: 1933  PCP: Huy Moscoso MD    Reason for Admission: COPD (chronic obstructive pulmonary disease) ([facility] [J44.9]  COPD (chronic obstructive pulmonary disease) ([facility] [J44.9]  COPD (chronic obstructive pulmonary disease) ([facility] [J44.9]    Cardiology follow-up :       History of presenting illness: 80 years old female who lives with the son admitted with increasing fatigue, cough, shortness of breath and altered mental status.  She was also hallucinating.  On admission she was severely hypoxic with oxygen saturation 81% and she also had significant hypercapnia.  ABG showed severe alkalosis.  ProBNP was elevated at 29,432 and chest x-ray showed bilateral pleural effusion and interstitial lung disease.  She was started on BiPAP and diuretics.  She had made some progress over the last 48 hours.     Labs on admission and 141, potassium 2.8, CO2 more than 45 BUN 7, creatinine 0.60 glucose 189, calcium 8.5, proBNP 29,432, high sensitivity troponin 33 albumin 3.2  WBC 12.1, hemoglobin 8.7, platelets 614, MCV 88, INR 0.9  Urine examination positive for nitrite, many bacteria, WBC 5-10  ABG pH 7.542, PCO2 71, CO2 54, oxygen saturation 88 , bicarb 60.7     Labs 5/19/2019 sodium 1369, potassium 4.7, CO2 44, chloride 86, glucose 133     ECG 5/17/2019 multifocal atrial rhythm / A.  Fib  2-D echo 5/17/2019 showed ejection fraction more than 55%, moderate mitral regurgitation, RVSP 31 mmHg  Chest x-ray showed chronic diffuse interstitial opacities likely interstitial lung disease, small bilateral pleural effusion and basilar atelectasis  CT head 5/17/2019 no acute intracranial abnormality, chronic microvascular ischemic changes  Past medical history:   Congestive heart failure diastolic, ejection fraction 55%  Coronary artery disease, stents ×5  Hypertension  Peripheral vascular disease, femoral artery bruit, exam  HEENT: Head: Normal, normocephalic, atraumatic. Neck: no adenopathy, no carotid bruit, no JVD, supple, symmetrical, trachea midline and thyroid not enlarged, symmetric, no tenderness/mass/nodules  Lungs: Chest expansion is very poor, inspiratory wheezing noted, diminished breath sounds both sides  Heart: regularly irregular rhythm  Abdomen: soft, non-tender; bowel sounds normal; no masses,  no organomegaly  Extremities: extremities normal, atraumatic, no cyanosis or edema  Neurologic: Mental status: Alert, oriented, thought content appropriate    EKG: normal sinus rhythm, PAC's noted. ECHO: reviewed. Ejection fraction: 55%  Stress Test: not obtained. Cardiac Angiography: not obtained.         Assessment / Acute Cardiac Problems:   Patient admitted on 5/17/2019 with respiratory failure, severe hypercapnia, severe hypoxia, severe alkalosis, hallucination and altered mental status  ABG on admission pH 7.54, PCO2 71, PCO2 54, oxygen saturation 88%, bicarb 60.7 and base excess 17.3  Diastolic congestive heart failure with bilateral pleural effusion, proBNP 29,430-1 5/17/2019  A. fib with RVR on admission at present heart rate stable, episode of A. fib with slow ventricular response heart rate 40-50 on 5/19/2019     Congestive heart failure diastolic, ejection fraction 55%  Coronary artery disease, stents ×5  Hypertension  Peripheral vascular disease, femoral artery bruit, stents  Anemia  Severe COPD secondary to smoking  Status post bowel surgery history of septic shock in 2016  History of recurrent UTI     5/21/2019 hemodynamically stable, no further episode of bradycardia  5/23/2019 hemodynamically stable  5/24/2019 complaining abdominal fullness, heartburn, continued to have shortness of breath on mild exertion  5/25/2019 continued to have a cough and wheezing significant improvement in the heart rate        Patient Active Problem List:     Sepsis due to pneumonia ([address]     Hyponatremia     Tachycardia Altered mental status     Hypertension     Chronic obstructive pulmonary disease with acute lower respiratory infection (HCC)     A-fib (HCC)     CAD (coronary artery disease)     Septic shock due to undetermined organism ([facility]     Protein-calorie malnutrition, severe (HCC)     Decubitus ulcer, stage 1     E-coli UTI     Acute cystitis     Acute on chronic diastolic congestive heart failure ([facility])     Hypophosphatemia     Non-sustained ventricular tachycardia ([facility])     Leukocytosis     Anemia     Hypokalemia     Enterococcus UTI     Urinary tract infection without hematuria     Hyperkalemia     COPD (chronic obstructive pulmonary disease) ([facility]     Severe malnutrition ([facility]      Plan of Treatment:   Continue with the beta blockers, diuretics and spironolactone    Electronically signed by Claudeen Sers, MD on 5/25/2019 at 5:59 PM

## 2019-05-26 NOTE — PROGRESS NOTES
[facility] pt up and transferred to [address] [place] [address] via stretcher. At time of discharge pt was on 4L NC and her oxygen saturation was 96%. Pt less anxious at time of discharge. Pt discharged with belongings. Pt resting comfortably at this time.

## 2019-05-26 NOTE — FLOWSHEET NOTE
05/25/19 2026   Provider Notification   Reason for Communication Review case   Provider Name Dr. oDni Myrick    Provider Notification Physician   Method of Communication Call   Response No new orders   Notification Time 2026   RN updated Dr. Doni Myrick and explained that the pt was supposed to go to [facility] and became anxious and her O2 sat was 80% on 4L NC. Rn explained that pt was placed on the venti mask and her O2 sat improved to 96%. RN explained that pt was still on the venti mask and her HR was 104. Physician stated that the pt was still okay to go the [facility].

## 2019-05-26 NOTE — PROGRESS NOTES
Pt is less anxious at this time and has been resting comfortably wearing 4L NC since 2040.  Since that time the pts oxygen saturation has been 94-96%

## 2019-05-30 LAB
EKG ATRIAL RATE: 72 BPM
EKG Q-T INTERVAL: 320 MS
EKG QRS DURATION: 68 MS
EKG QTC CALCULATION (BAZETT): 395 MS
EKG R AXIS: 61 DEGREES
EKG T AXIS: 13 DEGREES
EKG VENTRICULAR RATE: 92 BPM